# Patient Record
Sex: FEMALE | URBAN - NONMETROPOLITAN AREA
[De-identification: names, ages, dates, MRNs, and addresses within clinical notes are randomized per-mention and may not be internally consistent; named-entity substitution may affect disease eponyms.]

---

## 2017-01-06 RX ORDER — OLMESARTAN MEDOXOMIL 40 MG/1
TABLET, FILM COATED ORAL
Qty: 84 TABLET | Refills: 0 | Status: SHIPPED | OUTPATIENT
Start: 2017-01-06 | End: 2017-01-11 | Stop reason: ALTCHOICE

## 2017-01-11 ENCOUNTER — OFFICE VISIT (OUTPATIENT)
Dept: FAMILY MEDICINE CLINIC | Facility: CLINIC | Age: 22
End: 2017-01-11

## 2017-01-11 VITALS
WEIGHT: 165.81 LBS | SYSTOLIC BLOOD PRESSURE: 118 MMHG | DIASTOLIC BLOOD PRESSURE: 78 MMHG | TEMPERATURE: 99 F | BODY MASS INDEX: 29 KG/M2

## 2017-01-11 DIAGNOSIS — Z00.00 WELL ADULT EXAM: Primary | ICD-10-CM

## 2017-01-11 PROCEDURE — 99214 OFFICE O/P EST MOD 30 MIN: CPT | Performed by: INTERNAL MEDICINE

## 2017-01-11 PROCEDURE — 88175 CYTOPATH C/V AUTO FLUID REDO: CPT | Performed by: INTERNAL MEDICINE

## 2017-01-11 NOTE — PROGRESS NOTES
HPI:   Ramon Martinez is a 19year old female who presents for a complete physical exam. Symptoms: denies discharge, itching, burning or dysuria, periods are regular. Patient complains of nothing.        There is no immunization history on file for this pa suspicious lesions  HEENT: atraumatic, normocephalic,ears and throat are clear  EYES:PERRLA, EOMI, normal optic disk,conjunctiva are clear  NECK: supple,no adenopathy,no bruits  CHEST: no chest tenderness  BREAST: no dominant or suspicious mass  LUNGS: ian

## 2017-03-09 RX ORDER — NORGESTIMATE AND ETHINYL ESTRADIOL 7DAYSX3 28
KIT ORAL
Qty: 84 TABLET | Refills: 3 | Status: SHIPPED | OUTPATIENT
Start: 2017-03-09 | End: 2018-02-11

## 2017-07-03 ENCOUNTER — TELEPHONE (OUTPATIENT)
Dept: FAMILY MEDICINE CLINIC | Facility: CLINIC | Age: 22
End: 2017-07-03

## 2018-02-12 RX ORDER — NORGESTIMATE AND ETHINYL ESTRADIOL 7DAYSX3 28
KIT ORAL
Qty: 84 TABLET | Refills: 0 | Status: SHIPPED | OUTPATIENT
Start: 2018-02-12 | End: 2018-02-21 | Stop reason: ALTCHOICE

## 2018-02-12 NOTE — TELEPHONE ENCOUNTER
Last office visit: 1/11/2017    Last refill: 3/9/2017     Pending Prescriptions Disp Refills    NORGESTIM-ETH ESTRAD TRIPHASIC 0.18/0.215/0.25 MG-35 MCG Oral Tab [Pharmacy Med Name: NORGEST/EE  TAB  TRI 28] 84 tablet      Sig: TAKE 1 TABLET BY MOUTH  DAILY

## 2018-02-12 NOTE — TELEPHONE ENCOUNTER
Patient advised, she said that she got the implanted device so she does not need the [facility], order cancelled at [employer]. Appointment scheduled for a pap and physical on 2/21/18.

## 2018-02-21 ENCOUNTER — OFFICE VISIT (OUTPATIENT)
Dept: FAMILY MEDICINE CLINIC | Facility: CLINIC | Age: 23
End: 2018-02-21

## 2018-02-21 VITALS
TEMPERATURE: 98 F | DIASTOLIC BLOOD PRESSURE: 80 MMHG | HEIGHT: 64 IN | WEIGHT: 157.25 LBS | OXYGEN SATURATION: 99 % | HEART RATE: 117 BPM | BODY MASS INDEX: 26.85 KG/M2 | SYSTOLIC BLOOD PRESSURE: 110 MMHG

## 2018-02-21 DIAGNOSIS — Z00.00 WELL ADULT EXAM: Primary | ICD-10-CM

## 2018-02-21 PROCEDURE — 88175 CYTOPATH C/V AUTO FLUID REDO: CPT | Performed by: INTERNAL MEDICINE

## 2018-02-21 PROCEDURE — 87591 N.GONORRHOEAE DNA AMP PROB: CPT | Performed by: INTERNAL MEDICINE

## 2018-02-21 PROCEDURE — 99395 PREV VISIT EST AGE 18-39: CPT | Performed by: INTERNAL MEDICINE

## 2018-02-21 PROCEDURE — 87491 CHLMYD TRACH DNA AMP PROBE: CPT | Performed by: INTERNAL MEDICINE

## 2018-02-21 NOTE — PROGRESS NOTES
HPI:   Noreen Carter is a 23year old female who presents for a complete physical exam. Symptoms: denies discharge, itching, burning or dysuria, periods are irregular and she has a new iplanonon since Dec 2017 .  Patient complains of headaches, some bloat Wt 157 lb 4 oz   LMP 12/01/2017 (Approximate)   SpO2 99%   BMI 26.99 kg/m²   Body mass index is 26.99 kg/m².    GENERAL: well developed, well nourished,in no apparent distress  SKIN: no rashes,no suspicious lesions  HEENT: atraumatic, normocephalic,ears and

## 2018-02-22 LAB
C TRACH DNA SPEC QL NAA+PROBE: NEGATIVE
N GONORRHOEA DNA SPEC QL NAA+PROBE: NEGATIVE

## 2018-11-23 ENCOUNTER — OFFICE VISIT (OUTPATIENT)
Dept: FAMILY MEDICINE CLINIC | Facility: CLINIC | Age: 23
End: 2018-11-23
Payer: COMMERCIAL

## 2018-11-23 VITALS
HEART RATE: 100 BPM | TEMPERATURE: 99 F | SYSTOLIC BLOOD PRESSURE: 110 MMHG | DIASTOLIC BLOOD PRESSURE: 70 MMHG | OXYGEN SATURATION: 98 % | RESPIRATION RATE: 18 BRPM

## 2018-11-23 DIAGNOSIS — H02.849 SWELLING OF EYELID, UNSPECIFIED LATERALITY: Primary | ICD-10-CM

## 2018-11-23 PROCEDURE — 99213 OFFICE O/P EST LOW 20 MIN: CPT | Performed by: NURSE PRACTITIONER

## 2018-11-23 RX ORDER — PREDNISONE 20 MG/1
20 TABLET ORAL DAILY
Qty: 3 TABLET | Refills: 0 | Status: SHIPPED | OUTPATIENT
Start: 2018-11-23 | End: 2018-11-26

## 2018-11-23 NOTE — PROGRESS NOTES
CHIEF COMPLAINT:     Patient presents with:  Eyelid Swelling: itching x 1 day       HPI:   Judson Don is a 20year old female who presents with complaints of bilat eye itching and swelling since yesterday. Pt denies any change in vision.  Pt denies any Swelling of eyelid, unspecified laterality  -     predniSONE 20 MG Oral Tab; Take 1 tablet (20 mg total) by mouth daily for 3 days.       PLAN:    Patient Instructions   Use Claritin 10mg daily and Benadryl 25mg at bedtime      Conjunctivitis, Allergic    C Call your healthcare provider right away if any of these occur:  · Increased eyelid swelling  · New or worsening drainage from the eye  · Increasing redness around the eye  · Facial swelling  Date Last Reviewed: 7/1/2017  © 6157-7274 [employer],

## 2018-11-23 NOTE — PATIENT INSTRUCTIONS
Use Claritin 10mg daily and Benadryl 25mg at bedtime      Conjunctivitis, Allergic    Conjunctivitis is an irritation of a thin membrane in the eye. This membrane is called the conjunctiva. It covers the white of the eye and the inside of the eyelid.  The c swelling  Date Last Reviewed: 7/1/2017  © 4698-0456 [employer]. [address] [facility] [place] [facility] [place]. All rights reserved. This information is not intended as a substitute for professional medical care.  Always follow your healthcare pro

## 2019-01-16 ENCOUNTER — OFFICE VISIT (OUTPATIENT)
Dept: FAMILY MEDICINE CLINIC | Facility: CLINIC | Age: 24
End: 2019-01-16
Payer: COMMERCIAL

## 2019-01-16 VITALS
HEART RATE: 70 BPM | TEMPERATURE: 98 F | DIASTOLIC BLOOD PRESSURE: 62 MMHG | OXYGEN SATURATION: 98 % | SYSTOLIC BLOOD PRESSURE: 116 MMHG | RESPIRATION RATE: 18 BRPM | WEIGHT: 157 LBS | BODY MASS INDEX: 27 KG/M2

## 2019-01-16 DIAGNOSIS — H10.023 OTHER MUCOPURULENT CONJUNCTIVITIS OF BOTH EYES: Primary | ICD-10-CM

## 2019-01-16 PROCEDURE — 99213 OFFICE O/P EST LOW 20 MIN: CPT | Performed by: PHYSICIAN ASSISTANT

## 2019-01-16 RX ORDER — CIPROFLOXACIN HYDROCHLORIDE 3.5 MG/ML
1 SOLUTION/ DROPS TOPICAL EVERY 6 HOURS
Qty: 5 ML | Refills: 0 | Status: SHIPPED | OUTPATIENT
Start: 2019-01-16 | End: 2019-09-30 | Stop reason: ALTCHOICE

## 2019-01-16 NOTE — PATIENT INSTRUCTIONS
Please follow up with your PCP if no improvement within 5-7 days. Go directly to the ER for any acute worsening of symptoms. Patient Instructions    Conjunctivitis (Pink Eye) is very contagious.  This is spread by direct contact after touching your infect

## 2019-01-16 NOTE — PROGRESS NOTES
CHIEF COMPLAINT:   Patient presents with:  Eye Problem: redness and discharge in both eyes x 1 day, contact lens wearer     HPI:   Bula Heimlich is a 20year old female who presents with chief complaint of \"pink eye\". Symptoms began  1  days ago.  Sympt EYES: PERRLA, EOMI, bilateral conjunctiva erythematous, injected, scant yellow discharge bilaerally, no tearing,  no lid swelling.  No swelling or redness of periorbital tissue.    HENT: atraumatic, normocephalic,ears and throat are clear  NECK: supple, no Apply a new clean warm moist compress to the affected eye as often as possible today. This is comforting and the warm compress increases the blood flow to the area and promotes healing.    Discard any contact lenses that were worn recently and do not wear c

## 2019-09-30 ENCOUNTER — OFFICE VISIT (OUTPATIENT)
Dept: FAMILY MEDICINE CLINIC | Facility: CLINIC | Age: 24
End: 2019-09-30

## 2019-09-30 VITALS
HEIGHT: 64 IN | WEIGHT: 170 LBS | RESPIRATION RATE: 25 BRPM | DIASTOLIC BLOOD PRESSURE: 70 MMHG | SYSTOLIC BLOOD PRESSURE: 100 MMHG | TEMPERATURE: 98 F | HEART RATE: 80 BPM | BODY MASS INDEX: 29.02 KG/M2

## 2019-09-30 DIAGNOSIS — R42 DIZZINESS: Primary | ICD-10-CM

## 2019-09-30 PROCEDURE — 85025 COMPLETE CBC W/AUTO DIFF WBC: CPT | Performed by: INTERNAL MEDICINE

## 2019-09-30 PROCEDURE — 80053 COMPREHEN METABOLIC PANEL: CPT | Performed by: INTERNAL MEDICINE

## 2019-09-30 PROCEDURE — 99214 OFFICE O/P EST MOD 30 MIN: CPT | Performed by: INTERNAL MEDICINE

## 2019-09-30 PROCEDURE — 84443 ASSAY THYROID STIM HORMONE: CPT | Performed by: INTERNAL MEDICINE

## 2019-09-30 PROCEDURE — 84439 ASSAY OF FREE THYROXINE: CPT | Performed by: INTERNAL MEDICINE

## 2019-09-30 NOTE — PROGRESS NOTES
Ann Bond is a 30year old female. HPI:   Pt had an episode of visual changes when driving last week. She had no prodrome and felt like her head was tilting back and forth. No hx of migraine and no symptoms since.   Lasted about 5 seconds, but she Prescriptions      No prescriptions requested or ordered in this encounter       Imaging & Consults:  None    Follow up as needed. The patient indicates understanding of these issues and agrees to the plan.

## 2020-09-15 ENCOUNTER — OFFICE VISIT (OUTPATIENT)
Dept: FAMILY MEDICINE CLINIC | Facility: CLINIC | Age: 25
End: 2020-09-15

## 2020-09-15 VITALS
RESPIRATION RATE: 16 BRPM | HEART RATE: 77 BPM | TEMPERATURE: 98 F | DIASTOLIC BLOOD PRESSURE: 60 MMHG | OXYGEN SATURATION: 98 % | WEIGHT: 170 LBS | HEIGHT: 64 IN | BODY MASS INDEX: 29.02 KG/M2 | SYSTOLIC BLOOD PRESSURE: 110 MMHG

## 2020-09-15 DIAGNOSIS — Z23 NEED FOR VACCINATION: ICD-10-CM

## 2020-09-15 DIAGNOSIS — Z00.00 WELL ADULT EXAM: Primary | ICD-10-CM

## 2020-09-15 DIAGNOSIS — Z30.9 ENCOUNTER FOR CONTRACEPTIVE MANAGEMENT, UNSPECIFIED TYPE: ICD-10-CM

## 2020-09-15 PROCEDURE — 88175 CYTOPATH C/V AUTO FLUID REDO: CPT | Performed by: INTERNAL MEDICINE

## 2020-09-15 PROCEDURE — 3008F BODY MASS INDEX DOCD: CPT | Performed by: INTERNAL MEDICINE

## 2020-09-15 PROCEDURE — 90471 IMMUNIZATION ADMIN: CPT | Performed by: INTERNAL MEDICINE

## 2020-09-15 PROCEDURE — 87491 CHLMYD TRACH DNA AMP PROBE: CPT | Performed by: INTERNAL MEDICINE

## 2020-09-15 PROCEDURE — 87591 N.GONORRHOEAE DNA AMP PROB: CPT | Performed by: INTERNAL MEDICINE

## 2020-09-15 PROCEDURE — 90686 IIV4 VACC NO PRSV 0.5 ML IM: CPT | Performed by: INTERNAL MEDICINE

## 2020-09-15 PROCEDURE — 3074F SYST BP LT 130 MM HG: CPT | Performed by: INTERNAL MEDICINE

## 2020-09-15 PROCEDURE — 99395 PREV VISIT EST AGE 18-39: CPT | Performed by: INTERNAL MEDICINE

## 2020-09-15 PROCEDURE — 3078F DIAST BP <80 MM HG: CPT | Performed by: INTERNAL MEDICINE

## 2020-09-16 NOTE — PROGRESS NOTES
HPI:   Britta Pedraza is a 30year old female who presents for a complete physical exam. Symptoms: denies discharge, itching, burning or dysuria, periods are regular. Patient needs nexplanon swap out. Has been 3 years.          Immunization History  Admin anxiety  HEMATOLOGIC: denies hx of anemia  ENDOCRINE: denies thyroid history  ALL/ASTHMA: denies hx of allergy or asthma    EXAM:   /60 (BP Location: Right arm, Patient Position: Sitting, Cuff Size: adult)   Pulse 77   Temp 98.3 °F (36.8 °C) (Tempora

## 2020-09-17 LAB
C TRACH DNA SPEC QL NAA+PROBE: NEGATIVE
N GONORRHOEA DNA SPEC QL NAA+PROBE: NEGATIVE

## 2020-10-19 ENCOUNTER — TELEPHONE (OUTPATIENT)
Dept: OBGYN CLINIC | Facility: CLINIC | Age: 25
End: 2020-10-19

## 2020-10-19 NOTE — TELEPHONE ENCOUNTER
NP called requesting a Nexplanon removal and insert. She states that her mother-in-law knows you and that we should squeeze her in asap.

## 2020-10-20 NOTE — TELEPHONE ENCOUNTER
LMTCB re: appt. I did add pt to schedule so she will also be notified via [employer]ur[employer]. Advised in message for pt to call office if this does not work for her.      Future Appointments   Date Time Provider Sameera Lainez   11/4/2020 10:15 AM Evonne Luz

## 2020-11-04 ENCOUNTER — OFFICE VISIT (OUTPATIENT)
Dept: OBGYN CLINIC | Facility: CLINIC | Age: 25
End: 2020-11-04

## 2020-11-04 VITALS
HEIGHT: 64 IN | SYSTOLIC BLOOD PRESSURE: 102 MMHG | DIASTOLIC BLOOD PRESSURE: 60 MMHG | WEIGHT: 169 LBS | BODY MASS INDEX: 28.85 KG/M2

## 2020-11-04 DIAGNOSIS — Z32.02 PREGNANCY EXAMINATION OR TEST, NEGATIVE RESULT: Primary | ICD-10-CM

## 2020-11-04 DIAGNOSIS — Z30.46 ENCOUNTER FOR REMOVAL AND REINSERTION OF NEXPLANON: ICD-10-CM

## 2020-11-04 PROCEDURE — 3078F DIAST BP <80 MM HG: CPT | Performed by: OBSTETRICS & GYNECOLOGY

## 2020-11-04 PROCEDURE — 3074F SYST BP LT 130 MM HG: CPT | Performed by: OBSTETRICS & GYNECOLOGY

## 2020-11-04 PROCEDURE — 11983 REMOVE/INSERT DRUG IMPLANT: CPT | Performed by: OBSTETRICS & GYNECOLOGY

## 2020-11-04 PROCEDURE — 3008F BODY MASS INDEX DOCD: CPT | Performed by: OBSTETRICS & GYNECOLOGY

## 2020-11-04 PROCEDURE — 81025 URINE PREGNANCY TEST: CPT | Performed by: OBSTETRICS & GYNECOLOGY

## 2020-11-04 RX ORDER — LIDOCAINE HYDROCHLORIDE 10 MG/ML
5 INJECTION, SOLUTION INFILTRATION; PERINEURAL ONCE
Status: COMPLETED | OUTPATIENT
Start: 2020-11-04 | End: 2020-11-04

## 2020-11-04 RX ADMIN — LIDOCAINE HYDROCHLORIDE 5 ML: 10 INJECTION, SOLUTION INFILTRATION; PERINEURAL at 14:00:00

## 2020-11-04 NOTE — PROGRESS NOTES
Ashlyn Bonds presents today for nexplanon removal and reinsertion. She was counseled on risks of irregular bleeding and migration. Consent form signed    Her prior nexplanon was palpable in her left arm.  The prior incision was seen, but the nexplanon

## 2021-05-23 ENCOUNTER — PATIENT MESSAGE (OUTPATIENT)
Dept: FAMILY MEDICINE CLINIC | Facility: CLINIC | Age: 26
End: 2021-05-23

## 2021-05-24 NOTE — TELEPHONE ENCOUNTER
From: Amara Crowder  To: Jn Whitley MD  Sent: 5/23/2021 8:16 AM CDT  Subject: Non-Urgent Medical Question    Hi can you please update my records to show I have had the J&J COVID-19 vaccine please. Thank you!

## 2021-11-22 ENCOUNTER — OFFICE VISIT (OUTPATIENT)
Dept: OBGYN CLINIC | Facility: CLINIC | Age: 26
End: 2021-11-22

## 2021-11-22 VITALS
RESPIRATION RATE: 16 BRPM | WEIGHT: 186.19 LBS | DIASTOLIC BLOOD PRESSURE: 68 MMHG | SYSTOLIC BLOOD PRESSURE: 110 MMHG | BODY MASS INDEX: 31.79 KG/M2 | HEIGHT: 64 IN | HEART RATE: 82 BPM

## 2021-11-22 DIAGNOSIS — Z01.419 WELL WOMAN EXAM WITH ROUTINE GYNECOLOGICAL EXAM: Primary | ICD-10-CM

## 2021-11-22 DIAGNOSIS — Z30.09 CONTRACEPTIVE EDUCATION: ICD-10-CM

## 2021-11-22 PROBLEM — Z30.46 ENCOUNTER FOR REMOVAL AND REINSERTION OF NEXPLANON: Status: RESOLVED | Noted: 2020-11-04 | Resolved: 2021-11-22

## 2021-11-22 PROCEDURE — 99395 PREV VISIT EST AGE 18-39: CPT | Performed by: OBSTETRICS & GYNECOLOGY

## 2021-11-22 PROCEDURE — 3078F DIAST BP <80 MM HG: CPT | Performed by: OBSTETRICS & GYNECOLOGY

## 2021-11-22 PROCEDURE — 3074F SYST BP LT 130 MM HG: CPT | Performed by: OBSTETRICS & GYNECOLOGY

## 2021-11-22 PROCEDURE — 3008F BODY MASS INDEX DOCD: CPT | Performed by: OBSTETRICS & GYNECOLOGY

## 2021-11-23 NOTE — PATIENT INSTRUCTIONS
If you want to have the nexplanon removed please call for appt--make sure they know you need it removed and IUD inserted    For IUD I would want to send in a medicine called misoprostol to help dilate your cervix to make the entry easier  I would also have

## 2021-11-23 NOTE — PROGRESS NOTES
GYN H&P     2021  9:45 PM    CC: Patient is here for annual exam    HPI: patient is a 27year old  here for her annual exam, contraception consult  Patient has 2nd nexplanon that was placed last year  She was pleased with her first nexplanon. well-developed. She is not diaphoretic. Chest:   Breasts: Breasts are symmetrical.      Right: No inverted nipple, mass, nipple discharge, skin change or tenderness. Left: No inverted nipple, mass, nipple discharge, skin change or tenderness. cycle, irregular bleeding. She will consider her options  If desires to have the nexplanon removed and IUD inserted, she will need a 45 minute appt. I stressed to her to make sure if she calls for appt that this is clarified.      Saran Sanabria MD

## 2022-02-03 ENCOUNTER — OFFICE VISIT (OUTPATIENT)
Dept: FAMILY MEDICINE CLINIC | Facility: CLINIC | Age: 27
End: 2022-02-03
Payer: COMMERCIAL

## 2022-02-03 VITALS
RESPIRATION RATE: 16 BRPM | HEART RATE: 78 BPM | BODY MASS INDEX: 32.35 KG/M2 | OXYGEN SATURATION: 99 % | HEIGHT: 64 IN | SYSTOLIC BLOOD PRESSURE: 110 MMHG | TEMPERATURE: 98 F | DIASTOLIC BLOOD PRESSURE: 68 MMHG | WEIGHT: 189.5 LBS

## 2022-02-03 DIAGNOSIS — M25.561 ACUTE PAIN OF RIGHT KNEE: Primary | ICD-10-CM

## 2022-02-03 PROCEDURE — 3078F DIAST BP <80 MM HG: CPT | Performed by: INTERNAL MEDICINE

## 2022-02-03 PROCEDURE — 99214 OFFICE O/P EST MOD 30 MIN: CPT | Performed by: INTERNAL MEDICINE

## 2022-02-03 PROCEDURE — 3074F SYST BP LT 130 MM HG: CPT | Performed by: INTERNAL MEDICINE

## 2022-02-03 PROCEDURE — 3008F BODY MASS INDEX DOCD: CPT | Performed by: INTERNAL MEDICINE

## 2022-02-03 RX ORDER — MELOXICAM 15 MG/1
15 TABLET ORAL DAILY
Qty: 30 TABLET | Refills: 0 | Status: SHIPPED | OUTPATIENT
Start: 2022-02-03 | End: 2022-03-05

## 2022-11-10 ENCOUNTER — TELEPHONE (OUTPATIENT)
Dept: FAMILY MEDICINE CLINIC | Facility: CLINIC | Age: 27
End: 2022-11-10

## 2022-11-10 NOTE — TELEPHONE ENCOUNTER
I can take her on. However, will there be insurance issues with her having an HMO and Dr. Quang Vazquez listed as PCP.   Thanks

## 2022-11-10 NOTE — TELEPHONE ENCOUNTER
PT's spouse Render Cons called he wants to know if Dr. Duane Ortega would be will to establish care with pt? She is needing to have a yearly physical.  He is a pt of [facility]. Is  willing to take on pt?     Please call spouse at [phone]

## 2022-12-09 ENCOUNTER — OFFICE VISIT (OUTPATIENT)
Dept: FAMILY MEDICINE CLINIC | Facility: CLINIC | Age: 27
End: 2022-12-09
Payer: COMMERCIAL

## 2022-12-09 VITALS
RESPIRATION RATE: 16 BRPM | TEMPERATURE: 97 F | HEART RATE: 74 BPM | WEIGHT: 180.63 LBS | OXYGEN SATURATION: 97 % | SYSTOLIC BLOOD PRESSURE: 110 MMHG | DIASTOLIC BLOOD PRESSURE: 76 MMHG | BODY MASS INDEX: 31 KG/M2

## 2022-12-09 DIAGNOSIS — Z00.00 WELL ADULT EXAM: Primary | ICD-10-CM

## 2022-12-09 DIAGNOSIS — N92.6 ABNORMAL MENSES: ICD-10-CM

## 2022-12-09 PROBLEM — Z30.09 CONTRACEPTIVE EDUCATION: Status: RESOLVED | Noted: 2021-11-22 | Resolved: 2022-12-09

## 2022-12-09 PROCEDURE — 90471 IMMUNIZATION ADMIN: CPT | Performed by: FAMILY MEDICINE

## 2022-12-09 PROCEDURE — 90715 TDAP VACCINE 7 YRS/> IM: CPT | Performed by: FAMILY MEDICINE

## 2022-12-09 PROCEDURE — 99395 PREV VISIT EST AGE 18-39: CPT | Performed by: FAMILY MEDICINE

## 2022-12-09 PROCEDURE — 3078F DIAST BP <80 MM HG: CPT | Performed by: FAMILY MEDICINE

## 2022-12-09 PROCEDURE — 3074F SYST BP LT 130 MM HG: CPT | Performed by: FAMILY MEDICINE

## 2022-12-22 ENCOUNTER — NURSE ONLY (OUTPATIENT)
Dept: FAMILY MEDICINE CLINIC | Facility: CLINIC | Age: 27
End: 2022-12-22
Payer: COMMERCIAL

## 2022-12-22 DIAGNOSIS — N92.6 ABNORMAL MENSES: ICD-10-CM

## 2022-12-22 DIAGNOSIS — Z00.00 WELL ADULT EXAM: ICD-10-CM

## 2022-12-22 LAB
ALT SERPL-CCNC: 24 U/L
ANION GAP SERPL CALC-SCNC: 4 MMOL/L (ref 0–18)
AST SERPL-CCNC: 13 U/L (ref 15–37)
BUN BLD-MCNC: 11 MG/DL (ref 7–18)
CALCIUM BLD-MCNC: 9 MG/DL (ref 8.5–10.1)
CHLORIDE SERPL-SCNC: 108 MMOL/L (ref 98–112)
CHOLEST SERPL-MCNC: 136 MG/DL (ref ?–200)
CO2 SERPL-SCNC: 27 MMOL/L (ref 21–32)
CREAT BLD-MCNC: 0.7 MG/DL
ERYTHROCYTE [DISTWIDTH] IN BLOOD BY AUTOMATED COUNT: 11.9 %
FASTING PATIENT LIPID ANSWER: YES
FASTING STATUS PATIENT QL REPORTED: YES
GFR SERPLBLD BASED ON 1.73 SQ M-ARVRAT: 120 ML/MIN/1.73M2 (ref 60–?)
GLUCOSE BLD-MCNC: 95 MG/DL (ref 70–99)
HCT VFR BLD AUTO: 43.7 %
HDLC SERPL-MCNC: 64 MG/DL (ref 40–59)
HGB BLD-MCNC: 14.4 G/DL
LDLC SERPL CALC-MCNC: 55 MG/DL (ref ?–100)
MCH RBC QN AUTO: 31.8 PG (ref 26–34)
MCHC RBC AUTO-ENTMCNC: 33 G/DL (ref 31–37)
MCV RBC AUTO: 96.5 FL
NONHDLC SERPL-MCNC: 72 MG/DL (ref ?–130)
OSMOLALITY SERPL CALC.SUM OF ELEC: 287 MOSM/KG (ref 275–295)
PLATELET # BLD AUTO: 279 10(3)UL (ref 150–450)
POTASSIUM SERPL-SCNC: 4.2 MMOL/L (ref 3.5–5.1)
RBC # BLD AUTO: 4.53 X10(6)UL
SODIUM SERPL-SCNC: 139 MMOL/L (ref 136–145)
TRIGL SERPL-MCNC: 88 MG/DL (ref 30–149)
TSI SER-ACNC: 1.52 MIU/ML (ref 0.36–3.74)
VLDLC SERPL CALC-MCNC: 13 MG/DL (ref 0–30)
WBC # BLD AUTO: 8.8 X10(3) UL (ref 4–11)

## 2022-12-22 PROCEDURE — 84443 ASSAY THYROID STIM HORMONE: CPT | Performed by: FAMILY MEDICINE

## 2022-12-22 PROCEDURE — 80048 BASIC METABOLIC PNL TOTAL CA: CPT | Performed by: FAMILY MEDICINE

## 2022-12-22 PROCEDURE — 80061 LIPID PANEL: CPT | Performed by: FAMILY MEDICINE

## 2022-12-22 PROCEDURE — 85027 COMPLETE CBC AUTOMATED: CPT | Performed by: FAMILY MEDICINE

## 2022-12-22 PROCEDURE — 84460 ALANINE AMINO (ALT) (SGPT): CPT | Performed by: FAMILY MEDICINE

## 2022-12-22 PROCEDURE — 84450 TRANSFERASE (AST) (SGOT): CPT | Performed by: FAMILY MEDICINE

## 2022-12-22 NOTE — PROGRESS NOTES
Patient to clinic for labs per PHYLLIS Guan and mint tube drawn right AC x 1 attempt    Patient left office in stable condition

## 2022-12-27 ENCOUNTER — OFFICE VISIT (OUTPATIENT)
Dept: FAMILY MEDICINE CLINIC | Facility: CLINIC | Age: 27
End: 2022-12-27
Payer: COMMERCIAL

## 2022-12-27 VITALS
RESPIRATION RATE: 18 BRPM | WEIGHT: 179.5 LBS | HEART RATE: 66 BPM | BODY MASS INDEX: 31 KG/M2 | SYSTOLIC BLOOD PRESSURE: 118 MMHG | TEMPERATURE: 98 F | OXYGEN SATURATION: 98 % | DIASTOLIC BLOOD PRESSURE: 76 MMHG

## 2022-12-27 DIAGNOSIS — L70.0 CYSTIC ACNE: Primary | ICD-10-CM

## 2022-12-27 PROCEDURE — 3078F DIAST BP <80 MM HG: CPT | Performed by: FAMILY MEDICINE

## 2022-12-27 PROCEDURE — 99214 OFFICE O/P EST MOD 30 MIN: CPT | Performed by: FAMILY MEDICINE

## 2022-12-27 PROCEDURE — 3074F SYST BP LT 130 MM HG: CPT | Performed by: FAMILY MEDICINE

## 2022-12-27 RX ORDER — DOXYCYCLINE HYCLATE 100 MG/1
100 CAPSULE ORAL 2 TIMES DAILY
Qty: 60 CAPSULE | Refills: 0 | Status: SHIPPED | OUTPATIENT
Start: 2022-12-27 | End: 2023-01-26

## 2023-01-16 NOTE — PROGRESS NOTES
Elroy Silva presents today for nexplanon removal and kyleena inserted. She was counseled on risks of irregular bleeding and migration. Consent form signed    The patient was taken to the procedure room where the arm was prepped with alcohol swab. Local anesthetic 1% lidocaine was used to inject the site. Betadine was used to cleanse the area. Anesthesia was confirmed    Then, attention was turned to the [facility] insertion. Speculum was inserted. Old brown discharge was noted in the vault. The cervix was visualized and grasped with a tenaculum. The uterus was sounded to 6.5 cm. Then, the kyleena was inserted without difficulty and IUD strings were trimmed to 2.5 cm. The tenaculum was removed and sites hemostatic. The speculum was removed. The site of the prior nexplanon was palpated and small 2 mm skin incision made with the scalpel. The prior nexplanon was removed intact. The removal site was reapproximated with steri strips, a band aid, and a pressure dressing. The patient tolerated well. Pt to return for annual and IUD check.  Reviewed bleeding precautions    Author MD Melissa, 01/17/23, 11:02 AM

## 2023-01-17 ENCOUNTER — OFFICE VISIT (OUTPATIENT)
Dept: OBGYN CLINIC | Facility: CLINIC | Age: 28
End: 2023-01-17
Payer: COMMERCIAL

## 2023-01-17 VITALS
BODY MASS INDEX: 31 KG/M2 | WEIGHT: 180.25 LBS | HEART RATE: 78 BPM | SYSTOLIC BLOOD PRESSURE: 116 MMHG | DIASTOLIC BLOOD PRESSURE: 80 MMHG

## 2023-01-17 DIAGNOSIS — Z30.46 NEXPLANON REMOVAL: Primary | ICD-10-CM

## 2023-01-17 DIAGNOSIS — Z30.430 ENCOUNTER FOR IUD INSERTION: ICD-10-CM

## 2023-01-17 DIAGNOSIS — Z01.812 PRE-PROCEDURAL LABORATORY EXAMINATION: ICD-10-CM

## 2023-01-17 LAB
CONTROL LINE PRESENT WITH A CLEAR BACKGROUND (YES/NO): YES YES/NO
PREGNANCY TEST, URINE: NEGATIVE

## 2023-01-17 PROCEDURE — 58300 INSERT INTRAUTERINE DEVICE: CPT | Performed by: OBSTETRICS & GYNECOLOGY

## 2023-01-17 PROCEDURE — 11982 REMOVE DRUG IMPLANT DEVICE: CPT | Performed by: OBSTETRICS & GYNECOLOGY

## 2023-01-17 PROCEDURE — 81025 URINE PREGNANCY TEST: CPT | Performed by: OBSTETRICS & GYNECOLOGY

## 2023-01-17 PROCEDURE — 3079F DIAST BP 80-89 MM HG: CPT | Performed by: OBSTETRICS & GYNECOLOGY

## 2023-01-17 PROCEDURE — 3074F SYST BP LT 130 MM HG: CPT | Performed by: OBSTETRICS & GYNECOLOGY

## 2023-01-17 RX ORDER — IBUPROFEN 600 MG/1
600 TABLET ORAL ONCE
Status: COMPLETED | OUTPATIENT
Start: 2023-01-17 | End: 2023-01-17

## 2023-01-17 RX ADMIN — IBUPROFEN 600 MG: 600 TABLET ORAL at 11:07:00

## 2023-02-07 ENCOUNTER — OFFICE VISIT (OUTPATIENT)
Dept: OBGYN CLINIC | Facility: CLINIC | Age: 28
End: 2023-02-07
Payer: COMMERCIAL

## 2023-02-07 VITALS
HEIGHT: 66 IN | BODY MASS INDEX: 29.28 KG/M2 | WEIGHT: 182.19 LBS | SYSTOLIC BLOOD PRESSURE: 112 MMHG | HEART RATE: 85 BPM | DIASTOLIC BLOOD PRESSURE: 80 MMHG

## 2023-02-07 DIAGNOSIS — Z30.431 IUD CHECK UP: ICD-10-CM

## 2023-02-07 DIAGNOSIS — Z12.31 ENCOUNTER FOR SCREENING MAMMOGRAM FOR MALIGNANT NEOPLASM OF BREAST: ICD-10-CM

## 2023-02-07 DIAGNOSIS — Z01.419 WELL WOMAN EXAM WITH ROUTINE GYNECOLOGICAL EXAM: Primary | ICD-10-CM

## 2023-02-07 DIAGNOSIS — Z12.4 SCREENING FOR MALIGNANT NEOPLASM OF CERVIX: ICD-10-CM

## 2023-02-07 PROCEDURE — 3074F SYST BP LT 130 MM HG: CPT | Performed by: OBSTETRICS & GYNECOLOGY

## 2023-02-07 PROCEDURE — 99395 PREV VISIT EST AGE 18-39: CPT | Performed by: OBSTETRICS & GYNECOLOGY

## 2023-02-07 PROCEDURE — 3079F DIAST BP 80-89 MM HG: CPT | Performed by: OBSTETRICS & GYNECOLOGY

## 2023-02-07 PROCEDURE — 3008F BODY MASS INDEX DOCD: CPT | Performed by: OBSTETRICS & GYNECOLOGY

## 2023-04-18 ENCOUNTER — OFFICE VISIT (OUTPATIENT)
Dept: FAMILY MEDICINE CLINIC | Facility: CLINIC | Age: 28
End: 2023-04-18
Payer: COMMERCIAL

## 2023-04-18 VITALS
BODY MASS INDEX: 29 KG/M2 | DIASTOLIC BLOOD PRESSURE: 80 MMHG | TEMPERATURE: 98 F | SYSTOLIC BLOOD PRESSURE: 110 MMHG | OXYGEN SATURATION: 97 % | HEART RATE: 91 BPM | WEIGHT: 180 LBS | RESPIRATION RATE: 16 BRPM

## 2023-04-18 DIAGNOSIS — M79.671 RIGHT FOOT PAIN: Primary | ICD-10-CM

## 2023-04-18 PROCEDURE — 99213 OFFICE O/P EST LOW 20 MIN: CPT | Performed by: FAMILY MEDICINE

## 2023-04-18 PROCEDURE — 3079F DIAST BP 80-89 MM HG: CPT | Performed by: FAMILY MEDICINE

## 2023-04-18 PROCEDURE — 3074F SYST BP LT 130 MM HG: CPT | Performed by: FAMILY MEDICINE

## 2023-04-19 ENCOUNTER — TELEPHONE (OUTPATIENT)
Dept: ORTHOPEDICS CLINIC | Facility: CLINIC | Age: 28
End: 2023-04-19

## 2023-05-03 ENCOUNTER — HOSPITAL ENCOUNTER (OUTPATIENT)
Dept: GENERAL RADIOLOGY | Age: 28
Discharge: HOME | End: 2023-05-03
Attending: PODIATRIST
Payer: COMMERCIAL

## 2023-05-03 ENCOUNTER — OFFICE VISIT (OUTPATIENT)
Dept: ORTHOPEDICS CLINIC | Facility: CLINIC | Age: 28
End: 2023-05-03
Payer: COMMERCIAL

## 2023-05-03 VITALS — BODY MASS INDEX: 29.99 KG/M2 | HEIGHT: 65 IN | WEIGHT: 180 LBS

## 2023-05-03 DIAGNOSIS — M79.89 SWELLING OF RIGHT FOOT: ICD-10-CM

## 2023-05-03 DIAGNOSIS — L85.9 HYPERKERATOSIS: ICD-10-CM

## 2023-05-03 DIAGNOSIS — M79.671 FOOT PAIN, RIGHT: ICD-10-CM

## 2023-05-03 DIAGNOSIS — M25.571 SINUS TARSI SYNDROME OF RIGHT FOOT: Primary | ICD-10-CM

## 2023-05-03 PROCEDURE — 3008F BODY MASS INDEX DOCD: CPT | Performed by: PODIATRIST

## 2023-05-03 PROCEDURE — 73630 X-RAY EXAM OF FOOT: CPT | Performed by: PODIATRIST

## 2023-05-03 PROCEDURE — 99203 OFFICE O/P NEW LOW 30 MIN: CPT | Performed by: PODIATRIST

## 2023-12-09 ENCOUNTER — OFFICE VISIT (OUTPATIENT)
Dept: FAMILY MEDICINE CLINIC | Facility: CLINIC | Age: 28
End: 2023-12-09
Payer: COMMERCIAL

## 2023-12-09 VITALS
SYSTOLIC BLOOD PRESSURE: 118 MMHG | WEIGHT: 174.63 LBS | BODY MASS INDEX: 29.09 KG/M2 | HEART RATE: 92 BPM | DIASTOLIC BLOOD PRESSURE: 70 MMHG | HEIGHT: 65 IN | TEMPERATURE: 98 F | OXYGEN SATURATION: 98 %

## 2023-12-09 DIAGNOSIS — J02.9 SORE THROAT: Primary | ICD-10-CM

## 2023-12-09 LAB
CONTROL LINE PRESENT WITH A CLEAR BACKGROUND (YES/NO): YES YES/NO
KIT LOT #: 7023 NUMERIC
STREP GRP A CUL-SCR: NEGATIVE

## 2023-12-09 PROCEDURE — 99213 OFFICE O/P EST LOW 20 MIN: CPT | Performed by: NURSE PRACTITIONER

## 2023-12-09 PROCEDURE — 3078F DIAST BP <80 MM HG: CPT | Performed by: NURSE PRACTITIONER

## 2023-12-09 PROCEDURE — 3008F BODY MASS INDEX DOCD: CPT | Performed by: NURSE PRACTITIONER

## 2023-12-09 PROCEDURE — 3074F SYST BP LT 130 MM HG: CPT | Performed by: NURSE PRACTITIONER

## 2023-12-09 PROCEDURE — 87880 STREP A ASSAY W/OPTIC: CPT | Performed by: NURSE PRACTITIONER

## 2023-12-11 ENCOUNTER — PATIENT MESSAGE (OUTPATIENT)
Dept: FAMILY MEDICINE CLINIC | Facility: CLINIC | Age: 28
End: 2023-12-11

## 2023-12-11 RX ORDER — AZITHROMYCIN 250 MG/1
TABLET, FILM COATED ORAL
Qty: 6 TABLET | Refills: 0 | Status: SHIPPED | OUTPATIENT
Start: 2023-12-11 | End: 2023-12-15

## 2023-12-11 NOTE — TELEPHONE ENCOUNTER
From: Mrya Howell  To: Avaak Yessi  Sent: 12/11/2023 7:32 AM CST  Subject: Cough    Hi,    My cough is getting worse and at my appointment you mentioned if my cough got worse to let you know and you could prescribe a cough medicine.

## 2024-02-16 ENCOUNTER — OFFICE VISIT (OUTPATIENT)
Dept: OBGYN CLINIC | Facility: CLINIC | Age: 29
End: 2024-02-16
Payer: COMMERCIAL

## 2024-02-16 VITALS
WEIGHT: 176.13 LBS | DIASTOLIC BLOOD PRESSURE: 78 MMHG | BODY MASS INDEX: 29 KG/M2 | HEART RATE: 105 BPM | SYSTOLIC BLOOD PRESSURE: 116 MMHG

## 2024-02-16 DIAGNOSIS — Z01.419 WELL WOMAN EXAM WITH ROUTINE GYNECOLOGICAL EXAM: Primary | ICD-10-CM

## 2024-02-16 DIAGNOSIS — Z30.432 ENCOUNTER FOR REMOVAL OF INTRAUTERINE CONTRACEPTIVE DEVICE: ICD-10-CM

## 2024-02-16 DIAGNOSIS — Z30.432 ENCOUNTER FOR IUD REMOVAL: ICD-10-CM

## 2024-02-16 PROCEDURE — 99395 PREV VISIT EST AGE 18-39: CPT | Performed by: OBSTETRICS & GYNECOLOGY

## 2024-02-16 PROCEDURE — 3078F DIAST BP <80 MM HG: CPT | Performed by: OBSTETRICS & GYNECOLOGY

## 2024-02-16 PROCEDURE — 58301 REMOVE INTRAUTERINE DEVICE: CPT | Performed by: OBSTETRICS & GYNECOLOGY

## 2024-02-16 PROCEDURE — 3074F SYST BP LT 130 MM HG: CPT | Performed by: OBSTETRICS & GYNECOLOGY

## 2024-02-16 NOTE — PROGRESS NOTES
[facility]  Obstetrics and Gynecology  History & Physical    CC: Patient presents for a well woman exam     Subjective:     HPI: Savanah Jansen is a 28 year old  female here for a well women exam. Patient reports doing well. She would like to remove Kyleena IUD. Will try for pregnancy.     OB History:  OB History    Para Term  AB Living   0 0 0 0 0 0   SAB IAB Ectopic Multiple Live Births   0 0 0 0 0       Gyne History:  Hx Prior Abnormal Pap: No  Pap Date: 23  Pap Result Notes: wnl  No LMP recorded. (Menstrual status: IUD - Intrauterine Device).  NILM 2023  denies history of STDs (non-HPV).   Sexual history: Active? yes  Birth control? Kyleena 2023    Meds:  Current Outpatient Medications on File Prior to Visit   Medication Sig Dispense Refill    Levonorgestrel (KYLEENA IU) by Intrauterine route.       No current facility-administered medications on file prior to visit.       All:  No Known Allergies    PMH:  History reviewed. No pertinent past medical history.    Immunization History:   Immunization History   Administered Date(s) Administered    Covid-19 Vaccine [facility] (J&J) 0.5ml 2021    DTP/HIB Combined 1994    FLULAVAL 6 months & older 0.5 ml Prefilled syringe ([id number]) 09/15/2020    Hep B, Unspecified Formulation 08/10/1998, 2003, 2003    Hib, Unspecified Formulation 1993, 1993, 10/14/1994    MMR 10/14/1994, 08/10/1998    OPV 1993, 1993, 1994, 08/10/1998    TD 2003    TDAP 2022       PSH:  Past Surgical History:   Procedure Laterality Date    INSERT INTRAUTERINE DEVICE         Social History:  Social History     Socioeconomic History    Marital status:      Spouse name: Not on file    Number of children: Not on file    Years of education: Not on file    Highest education level: Not on file   Occupational History    Not on file   Tobacco Use    Smoking status: Never    Smokeless tobacco: Never    Vaping Use    Vaping Use: Never used   Substance and Sexual Activity    Alcohol use: No     Alcohol/week: 0.0 standard drinks of alcohol    Drug use: No    Sexual activity: Yes     Partners: Male     Birth control/protection: I.U.D.   Other Topics Concern    Caffeine Concern Yes    Exercise No    Seat Belt Yes    Special Diet Not Asked    Stress Concern Not Asked    Weight Concern Not Asked     Service Not Asked    Blood Transfusions Not Asked    Occupational Exposure Not Asked    Hobby Hazards Not Asked    Sleep Concern Not Asked    Back Care Not Asked    Bike Helmet Not Asked    Self-Exams Not Asked   Social History Narrative    Not on file     Social Determinants of Health     Financial Resource Strain: Not on file   Food Insecurity: Not on file   Transportation Needs: Not on file   Physical Activity: Not on file   Stress: Not on file   Social Connections: Not on file   Housing Stability: Not on file         Patient feels unsafe or threatened?: denies    Abuse: denies physical, sexual or mental.     Family History:  Family History   Problem Relation Age of Onset    Other (Lupus) Mother     Diabetes Mother        Health maintenance:  Mammogram (age 40 and q1-2yr): n/a  Colonoscopy (age 45 and q10yr): n/a    Review of Systems:  General: no complaints per category. See HPI for additional information.   Breast: no complaints per category. See HPI for additional information.   Respiratory: no complaints per category. See HPI for additional information.   Cardiovascular: no complaints per category. See HPI for additional information.   GI: no complaints per category. See HPI for additional information.   : no complaints per category. See HPI for additional information.   Heme: no complaints per category. See HPI for additional information.       Objective:     Vitals:    02/16/24 1331   BP: 116/78   Pulse: 105   Weight: 176 lb 2 oz (79.9 kg)         Body mass index is 29.31 kg/m².    General: AAO.NAD.   CVS  exam: normal peripheral perfusion  Chest: non-labored breathing, no tachypnea   Breast: symmetric, no dominant or suspicious mass, no skin or nipple changes and no axillary adenopathy  Abdominal exam:  soft, nontender, nondistended  Pelvic exam:   VULVA: normal appearing vulva with no masses, tenderness or lesions  PERINEUM: normal appearing, no lesions   URETHRAL MEATUS:  normal appearing, no lesions   VAGINA: normal appearing vagina with normal color and discharge, no lesions  CERVIX: normal appearing cervix without discharge or lesions. +2 IUD strings s/p removal.   UTERUS: uterus is normal size, shape, consistency and nontender  ADNEXA: normal adnexa in size, nontender and no masses  PERIRECTAL: normal appearing, no lesions   Ext: non-tender, no edema    Assessment:     Savanah Jansen is a 28 year old  female here for a well women exam.       Plan:       Problem List Items Addressed This Visit    None  Visit Diagnoses       Well woman exam with routine gynecological exam    -  Primary    Relevant Medications    Levonorgestrel (KYLEENA IU)    Encounter for IUD removal        Relevant Medications    Levonorgestrel (KYLEENA IU)            Cervical cancer screening  - discussion held with the patient about ASCCP guidelines  - repeat pap smear 2026   Health maintenance  - encouraged to maintain weight at healthy BMI  - discussed importance of exercise and healthy eating  - self breast exam instructions provided   Preconception counseling  - Recommend PNV daily   - pregnancy precautions provided   - IUD removed         Problem List Items Addressed This Visit    None  Visit Diagnoses       Well woman exam with routine gynecological exam    -  Primary    Relevant Medications    Levonorgestrel (KYLEENA IU)    Encounter for IUD removal        Relevant Medications    Levonorgestrel (KYLEENA IU)                  All of the findings and plan were discussed with the patient.  She notes understanding and agrees with  the plan of care.  All questions were answered to the best of my ability at this time.      RTC in 1 year for a well woman exam or sooner if needed     Alyssia Decker MD   [facility] - OBGYN      Discussed with patient that there will not be further notification of normal or benign results other than receiving results on mychart. A Rocket[employer]hart message or telephone call will be placed by the physician and/or office staff if results are abnormal.       Note to patient and family   The 21st Century Cures Act makes medical notes available to patients in the interest of transparency.  However, please be advised that this is a medical document.  It is intended as ntzm-kj-vuzr communication.  It is written and medical language may contain abbreviations or verbiage that are technical and unfamiliar.  It may appear blunt or direct.  Medical documents are intended to carry relevant information, facts as evident, and the clinical opinion of the practitioner.      This note could include assistance by Dragon voice recognition. Errors in content may be related to improper recognition by the system; efforts to review and correct have been done but errors may still exist.

## 2024-02-18 NOTE — PROCEDURES
Procedure Note: IUD removal     Preoperative Diagnosis:  IUD in place     Postoperative Diagnosis:  S/p IUD removal     Indications:  28 year old y/o  female with Kyleena IUD in placed since 2023 who presents for IUD removal per patient request.     Procedure:    A discussion was held with the patient about risks, benefits and alternatives for the procedure. The patient verbalized understanding and requested IUD removal. All questions and concerns addressed. Verbal and written consent was obtained.     The patient was placed in dorsal position with heels secured in stirrups. A sterile speculum was placed in the vagina. The cervix was visualized with 2 visible IUD strings noted from at the external os. Sterile ring forceps were then advanced towards the cervix and used to grasp the IUD strings. The IUD was then removed with the sterile forceps without difficulty or complications. The IUD appeared intact and was shown to the patient prior to be properly discarded. The patient reported she was doing well. All instrument were then removed from the vagina. The patient was advised Ibuprofen PRN for pain. Precautions provided to the patient. A discussion was held with the patient regarding contraception. The patient decided to try for pregnancy. The patient was advised to return to clinic in 1 year for a well woman exam or sooner if needed.     Condition: Stable    Disposition: RTC in 1 year or sooner    Alyssia Decker MD   [facility] - OBGYN    Discussed with patient that there will not be further notification of normal or benign results other than receiving results on Upplication. A Upplication message or telephone call will be placed by the physician and/or office staff if results are abnormal.     Note to patient and family   The 21st Century Cures Act makes medical notes available to patients in the interest of transparency.  However, please be advised that this is a medical document.  It is intended as wxly-bx-dhsr  communication.  It is written and medical language may contain abbreviations or verbiage that are technical and unfamiliar.  It may appear blunt or direct.  Medical documents are intended to carry relevant information, facts as evident, and the clinical opinion of the practitioner.

## 2024-05-20 ENCOUNTER — TELEPHONE (OUTPATIENT)
Dept: OBGYN CLINIC | Facility: CLINIC | Age: 29
End: 2024-05-20

## 2024-05-20 DIAGNOSIS — N91.2 AMENORRHEA: Primary | ICD-10-CM

## 2024-05-20 NOTE — TELEPHONE ENCOUNTER
Patient calling to initiate prenatal care  LMP 4/20/24  Patient is 7-8 weeks on 6/15/24  Confirmation Ultrasound and Appointment scheduled on   Future Appointments   Date Time Provider Department Center   6/14/2024 12:15 PM [facility] US [facility] [place]   6/14/2024  1:00 PM Rigo Cadena MD [facility] [place]   7/15/2024  2:00 PM Tonja Muhammad MD [facility] [place]   8/12/2024  2:00 PM Nga Barrow APN [facility] [place]         Any history of ectopic pregnancy? no  Any history of miscarriage? no  Any medications that you are taking on a regular basis other than prenatal vitamins? no (if not taking prenatal vitamins, encourage patient to start taking.)  Any bleeding since the first day of last LMP and your positive pregnancy test? no    Insurance bcbs o

## 2024-05-29 ENCOUNTER — TELEPHONE (OUTPATIENT)
Dept: OBGYN CLINIC | Facility: CLINIC | Age: 29
End: 2024-05-29

## 2024-05-29 NOTE — TELEPHONE ENCOUNTER
Called and spoke with pt. And  about pt's limited appetite and nausea, no vomiting though. ..  Pt. has US/ appointment. 6/14/24 with Dr. Whitehead.  Reviewed Prenatal list of Safe Medication List and N/V remedies ..  Recommended to keep diet simple and plaln, stay away from fried foods, fast food, rish foods.  Discussed trying Unisom/B6 remedy and Sea Bands .  Told pt. A lot of trial and error at this point..Try to at least take in fluids and stay hydrate and simple carbs like pasta, crackers, toast, etc. Proteins like simple chicken.  ER Precautions reviewed with pt. If not taking in anything for a 24 hour period or vomiting and unable to keep anything down. To ER for fluid replacement.  To call office with any questions or concerns.  Pt. Verbalizes understanding of info and agrees to plan.

## 2024-06-13 PROBLEM — Z30.430 ENCOUNTER FOR IUD INSERTION: Status: RESOLVED | Noted: 2023-01-17 | Resolved: 2024-06-13

## 2024-06-13 PROBLEM — L85.9 HYPERKERATOSIS: Status: RESOLVED | Noted: 2023-05-03 | Resolved: 2024-06-13

## 2024-06-13 PROBLEM — M79.89 SWELLING OF RIGHT FOOT: Status: RESOLVED | Noted: 2023-05-03 | Resolved: 2024-06-13

## 2024-06-13 PROBLEM — Z30.431 IUD CHECK UP: Status: RESOLVED | Noted: 2023-02-07 | Resolved: 2024-06-13

## 2024-06-14 ENCOUNTER — OFFICE VISIT (OUTPATIENT)
Dept: OBGYN CLINIC | Facility: CLINIC | Age: 29
End: 2024-06-14
Payer: COMMERCIAL

## 2024-06-14 ENCOUNTER — ULTRASOUND ENCOUNTER (OUTPATIENT)
Dept: OBGYN CLINIC | Facility: CLINIC | Age: 29
End: 2024-06-14

## 2024-06-14 VITALS
HEART RATE: 95 BPM | WEIGHT: 176 LBS | BODY MASS INDEX: 29 KG/M2 | SYSTOLIC BLOOD PRESSURE: 110 MMHG | DIASTOLIC BLOOD PRESSURE: 68 MMHG

## 2024-06-14 DIAGNOSIS — N91.2 AMENORRHEA: Primary | ICD-10-CM

## 2024-06-14 PROCEDURE — 76856 US EXAM PELVIC COMPLETE: CPT | Performed by: OBSTETRICS & GYNECOLOGY

## 2024-06-14 PROCEDURE — 3078F DIAST BP <80 MM HG: CPT | Performed by: OBSTETRICS & GYNECOLOGY

## 2024-06-14 PROCEDURE — 3074F SYST BP LT 130 MM HG: CPT | Performed by: OBSTETRICS & GYNECOLOGY

## 2024-06-14 PROCEDURE — 99213 OFFICE O/P EST LOW 20 MIN: CPT | Performed by: OBSTETRICS & GYNECOLOGY

## 2024-06-14 NOTE — PROGRESS NOTES
Subjective:  Patient presents complaining of no menses. Positive home pregnancy test.  LMP 4/20/24. No family history of any inheritable diseases or congenital birth defects on either side of family.    Objective:  /68   Pulse 95   Wt 176 lb (79.8 kg)   LMP 04/20/2024 (Exact Date)     Physical Examination:  General appearance: Well dressed, well nourished in no apparent distress  Neurologic/Psychiatric: Alert and oriented to person, place and time, mood normal, affect appropriate    Summary of Ultrasound Findings:  Ultrasound scan performed transabdominal.  LMP 4/20/24  7w6day by LMP;  8w3day by ultrasound  Viable intrauterine pregnancy  Dates consistent with ultrasound.  Final EDC:  1/25/25 by LMP consistent with ultrasound on 6/14/2024  Normal ovaries bilaterally.     Assessment/Plan:  Amenorrhea- Normal dating ultrasound  Follow up 3-4 weeks for new OB visit.  Prenatal vitamin with 0.4 mg folate, DHA.  Optional prenatal screening tests reviewed including cfdna, carrier screen/CF, NT/first trimester screen, Quad/AFP, Level 2 ultrasound, amniocentesis/CVS.  Bleeding precautions provided.    Diagnoses and all orders for this visit:    Amenorrhea  -     US PELVIS, ABDOMINAL [facility] ONLY       Return for New OB Visit.

## 2024-06-14 NOTE — PATIENT INSTRUCTIONS
Medications Safe in Pregnancy    The following over-the-counter medications may be taken safely after 12 weeks gestation by any patient who is pregnant.  Please follow the instructions on the package for adult dosage.  If you experience any symptoms prior to 12 weeks, please call the office at [phone].  (**these are all safe for you to use now. We ask that you use them sparingly in the first trimester and that you call us with any persistent concerns**)     Colds/Congestion: Flonase, Saline Nasal Spray, Neti Pot, Plain Robitussin, Robitussin DM, Mucinex DM, Vicks 44 E, Vicks Vapor rub, Cough drops without Zinc or Sudafed.   Contact your doctor if you have a persistent fever over 100.4, shortness of breath, coughing up green mucus, or a sore throat that persists from more than 3 days     Diarrhea: Imodium, Maalox Anti-Diarrheal or Kaopectate  Contact the office if you have diarrhea for more than 3 days.     Allergies: Benadryl, Claritin or Zyrtec     Hemorrhoids: Tucks pads, Preparation H, Annusol, Sitz bath or Witch hazel  Eat a high fiber diet and drink plenty of fluids.     Yeast: Monistat 3 or 7  Call if your symptoms do not improve, or if you experience vaginal bleeding, or a watery discharge.     Constipation: Metamucil, Colace, fiber supplement, Milk of Magnesia or Dulcolax  Drink plenty of fluids, eat high-fiber foods and take the above laxatives sporadically.      Headache or Mild aches and pain: Extra Strength Tylenol      Gas: Gas X, Gelusil, Papaya Tablets, Maalox, Mylicon or Mylanta Gas     Heartburn: Tums, Mylanta, Pepcid AC or Maalox     Sore throat: Alcohol free Chloraseptic spray or Lozenges      Nausea and Vomiting: ½ tablet Unisom plus 100mg of Vitamin B6 at bedtime (may increase B6 up to 200mg per day), Helen root tea or raspberry leaf tea     Insomnia: Tylenol PM, Vitamin B6 50mg, warm bath or milk, Unisom, Nytol or Sominex.      We have listed a few medications for common symptoms seen  in pregnancy.  Please contact the office if you are unsure about any over the counter medications that are not listed above.       Nausea and vomiting management in pregnancy     1. Half tablet of Unisom and 100 mg of Vitamin B6 at bedtime. Can repeat Vitamin B6 x1 in the morning or during the day  2. Small, frequent meals/snacks- especially important to include good proteins in your evening meal if you are feeling nauseated in the morning. This will keep your stomach full longer and reduce nausea.  3. Ginger tea, ginger ale, ginger chews, peppermint candies, hard candies  4. Keep a snack and or drink at your bedside to have before you even get out of bed in the morning  5. Sea Bands (https://www.seaGraphScienceband.com/) these can be worn all day/night as needed. The correct spot to wear them is 3 finger widths down from where your wrist bends on the inside     **if you find yourself unable to keep anything down for 24 hours or more, please go to one of the [facility] or the ER so that you can get IV fluids and medications**  Healthy Eating Habits During Pregnancy  It’s important to develop healthy eating habits while you are pregnant, for you as well as for your baby. Here are some ways to stay healthy.   Aim for a healthy weight  A slow, steady rate of weight gain is often best. After the first trimester, you may gain about a pound a week. If you were overweight before pregnancy, you need to gain fewer pounds. Your healthcare provider can give you a healthy weight goal for your pregnancy.   Don’t diet  Now is not the time to diet. You may not get enough of the nutrients you and your baby need. Instead, learn how to be a healthy eater. Start by doing it for your baby. Soon, you may do it for yourself.   Vitamins and supplements  Talk with your healthcare provider about taking these and other prenatal vitamins and supplements.   Iron makes the extra blood you need now.  Calcium and vitamin D help build and  keep strong bones.  Folic acid helps prevent certain birth defects.  Iodine helps the thyroid work right.  Some vitamins may not be safe to take. Your healthcare provider will tell you which ones to avoid.  Fluids  Drink at least 8 to 10 cups of fluid daily. Your baby needs fluids. Fluids also decrease constipation, flush out toxins and waste, limit swelling, and help prevent bladder infections. Water is best. Other good choices are:   Water or seltzer water with a slice of lemon or lime. (These can also help ease an upset stomach.)  Clear soups that are low in salt  Low-fat or fat-free milk, soy or rice milk with calcium added  Popsicles or gelatin  Things to avoid  Some things might harm your growing baby. Don’t eat or drink:   Alcohol  Unpasteurized dairy foods and juices  Raw or undercooked meat, poultry, fish, or eggs  Unwashed fruits and vegetables  Prepared meats, like deli meats or hot dogs, unless heated until steaming hot  Fish that are high in mercury, like shark, swordfish, tobi mackerel, tilefish, and albacore tuna  Things to limit  Ask your healthcare provider whether it’s safe to eat or drink:   Caffeine  Artificial sweeteners  Organ meats  Certain types of fish  Fish and shellfish that contain mercury in lower amounts, like shrimp, canned light tuna, salmon, pollock, and catfish      Foods to Avoid During Pregnancy  Deli Meats- You may eat deli meats from the deli.  If you eat deli meats in the package, it may be contaminated with Listeria. Heat all deli meats in a package to 165 degrees Fahrenheit before eating, even if the label states the meat is “pre-cooked”.    Soft Cheeses and Unpasteurized Products - You may eat soft cheeses that are pasteurized.  Please avoid all soft cheeses, milk or juice that is unpasteurized.  Fish- avoid fish that contains a high level of mercury. These include but are not limited to; [place], [place], Tile Fish, Shark, Swordfish, and Tobi Mackerel. Please see chart  below for good fish choices in pregnancy. Also avoid any raw, uncooked shellfish such as oysters, clams, or sushi.  Make sure to cook all meats; including ground beef, pork, and poultry to their desired temperatures before consuming. This reduces the chance of a food borne illness.  Caffeine- limit to 200 mg or an 8oz cup daily or less.   Alcohol- no amount of alcohol is determined to be safe in pregnancy. Do not drink any alcoholic beverages while pregnant.            [facility]- Prenatal Testing    The following information is designed to help you understand some of the optional tests that are available to you to screen for problems in your pregnancy.  Before considering any of these tests, you will need to consider the following questions:    [1] What would you do if an abnormality were detected?  If the answer is nothing, then you may decide to decline all testing.  [2] Would you be willing to undergo testing which might increase your risk of miscarriage, such as an amniocentesis or CVS (see below)?  [3] If you have the initial testing, and it indicates that there might be a problem, and you subsequently decide not to do invasive testing such as an amniocentesis, would you worry excessively through the remainder of the pregnancy?    The following tests are available to screen for problems in your pregnancy:    [1]  First Trimester Screening (also called Nuchal Thickness, Nuchal Translucency or NT)- This is an abdominal ultrasound done between 10 and 14 weeks by a perinatology specialist (Maternal Fetal Medicine, MFM) which measures the thickness of the skin behind your baby's neck.  Increased thickness of the skin in this area has been linked to an increased risk of genetic abnormalities like Down Syndrome.  A second visit may be required if the baby is not in the correct position.  You will need to schedule an appointment with the Maternal Fetal Medicine office.  Address and phone number  below:  Please verify insurance coverage:  CPT code: 81589 (diaz) or 26265 (twins)  Diagnosis: Genetic screening- Z36.8A  Maternal Fetal Medicine  Dr. Douglass, Dr. Bedoya, Dr. Lee, and Dr. Monahan  [address] [place] [address] [address] [place] [address]  Phone [phone]  Fax [phone]    [2] Cell-Free DNA testing (NIPT)- This is a blood test done any time after 10-11 weeks which screens for genetic abnormalities like Down Syndrome.  It is greater than 98% sensitive but requires an amniocentesis for confirmation if abnormal.  It can also tell you the sex of the baby.  Please see the included pamphlet for more information about cost and billing options.  CPT code: 56240  Diagnosis code: Genetic screening- 36.8A    [3] Quad Screen (also called AFP-Plus or Tetra Screen)- This is a blood test done which screens for both genetic abnormalities like Down Syndrome and neural tube defects (NTD's), such as spina bifida (an abnormal opening in the spine which can cause paralysis).  It is usually performed around 16-20 weeks.  If the Quad screen comes back abnormal, it does not mean that your baby definitely has an abnormality.  It only means that there is an increased risk of an abnormality.  Additional testing such as a Level II ultrasound or amniocentesis may be recommended (see below).  This test is less accurate at picking up genetic abnormalities than the cell-free DNA test.  If you have test #1 or 2, then usually only the AFP part of the Quad screen would be performed to screen for NTD's (see below).    [4]  Alpha Fetoprotein (AFP, MSAFP)- This is a simple blood test that screens for neural tube defects such as spina bifida (an abnormal opening in the spine).  It is usually performed around 16-20 weeks.  Additional testing such as a Level II ultrasound may be recommended for an abnormal test result.  Please verify insurance coverage:  CPT code: 63941 Diagnosis code: Genetic Screening- Z36.8A    [5] Cystic Fibrosis/SMA  Carrier/Fragile X Carrier Screening- Cystic Fibrosis is a genetic disease which causes lung problems in the infant.  SMA (spinal muscular atrophy) is a genetic disease that affects the nerve cells of the spinal cord.  Fragile X is the most common cause of mental disabilities.  These genetic defects would need to be passed from both parents to the child.  A blood test is performed on the mother, and if her test is positive, then the father should also be tested. These tests can be done at any time in the pregnancy, usually in the first trimester with your initial OB labs.  All babies are screened for cystic fibrosis after birth.  Please verify insurance coverage:  Cystic Fibrosis CPT Code: 99412 Diagnosis code: Cystic Fibrosis screening- Z13.228  SMA CPT Code: 09999 Diagnosis code: Genetic Screening- Z36.8A or Testing for Genetic Disease Carrier- Z13.71    [6]  Level II Ultrasound-  This is an abdominal ultrasound done at approximately 20-21 weeks by a perinatology specialist (ANN-MARIE) at [facility] which looks at many of the baby's internal structures.  Abnormalities in certain structures have been associated with an increased risk of genetic abnormalities.  It also screens for NTD's.  This ultrasound would replace the Level I Ultrasound that we normally perform at our office around the same time.    [7]  Amniocentesis-  During this procedure, a needle is passed through your abdominal wall to withdrawal some amniotic fluid from around the baby.  It screens for both genetic abnormalities and NTD's and is usually performed around 15-16 weeks.  This test has the highest accuracy for detecting genetic abnormalities, but there may be a small risk of miscarriage or other complications.  A similar procedure called Chorionic Villus Sampling (CVS) is performed by passing a catheter through the vagina to remove a small sample of tissue from the placenta (afterbirth).  CVS may have a higher risk of miscarriage and other rare  complications compared to amniocentesis but can be performed earlier in pregnancy.  Amniocentesis is often recommended/offered if any of the previously mentioned tests come back abnormal.  A pamphlet is available if you would like more information about this option.  If you decide to have an amniocentesis, the other tests would be unnecessary.      The above information covers some of the basics.  Pamphlets on the Cell-Free DNA test, Quad Screen and Cystic Fibrosis test should be in your prenatal packet.  Your doctor will discuss this information in more detail, and feel free to ask additional questions.  Also, remember that all of these tests are optional, and will only be performed at your request.  Testing that needs to be done through the perinatologist's office may require 2-3 weeks lead time to schedule.              PRENATAL INSTRUCTIONS    These prenatal instructions are specific to the care of our patients and supplement the books and pamphlets you are receiving.  The physicians and office staff are also available to answer questions not covered in this material.  We function as a group practice with physicians alternating call for deliveries.  For this reason you should see each of the doctors at least once during your pregnancy.  We provide continuous coverage of our patients and are committed to competent and concerned patient care.    PRENATAL CARE  Because of the importance of good prenatal care, you will have monthly office visits until the 28th week of pregnancy, then every two weeks for the next eight weeks and weekly during the final month.  Certain conditions may require more frequent visits.    MEDICATION  You will be given a prescription for prenatal vitamins with DHA to be taken until your six week postpartum visit or until you stop breastfeeding, whichever lasts longer.  These vitamins can occasionally exacerbate stomach irritation or constipation;  please contact us if you are having  difficulties tolerating your vitamin.  Should you become anemic during your pregnancy, you may receive a supplemental iron tablet to correct the condition.  Unnecessary medication should be avoided, but you may take Tylenol (Acetaminophen, regular or extra-strength) for ordinary headaches and Tums, Mylanta or Maalox for heartburn.  For cough or cold symptoms you may try Contac, Benadryl, Tylenol Sinus, Chlor-Trimeton, Actifed, Sudafed or plain Robitussin.  For constipation drink 6 to 8 glasses of water each day and increase your roughage intake with foods such as lettuce, bran, wheat bread and green vegetables.  You may take Metamucil, Colace or Senekot if the above do not work.  Please call the office to confirm the safety of any other prescription, over-the-counter or herbal medications.\Alcohol intake and tobacco should be avoided during pregnancy.  Caffeinated beverages (coffee, tea and soft drinks) should be limited to no more than 2 cups daily.  Pregnant women should consume the following each day through diet or supplements:  o Folic acid 400-800 micrograms   o Iron 30 mg (or be screened for anemia)  o Vitamin D 600 international units  o Calcium 1,000 mg    NUTRITION/HEALTH  Gaining weight is the easiest thing for a pregnant woman to do.  A well balanced diet of lean meat, fish, poultry, non-starchy vegetables, fruit and salads should accomplish an average weight gain of 2 to 4 pounds a month.  Avoid raw or undercooked fish or meats.  If you have deli meat, please microwave it or cook on the stove until steaming.  Soft cheeses (Camembert, Blue, Roquefort, Goat), all non-pasteurized foods, and large game fish (Shark, Swordfish, Tobi Mackerel, Tilefish, [place]) should be avoided, because of concerns about mercury exposure.  Other fish including canned light tuna should be limited to 2 servings per week.  Albacore (“white”) tuna should be limited to once per week.  Avoid cigarette smoking, alcohol, drugs, and  vaping.  If you have deli meat, please microwave it or cook on the stove until steaming.  Caffeine: Low-to-moderate caffeine intake in pregnancy does not appear to be associated with any adverse outcomes. Pregnant women may have caffeine but should probably limit it to less than 300 mg/d (a typical 8-ounce cup of brewed coffee has approximately 130 mg of caffeine. An 8-ounce cup of tea or 12-ounce soda has approximately 50 mg of caffeine), but exact amounts vary based on the specific beverage or food.  Avoid hot tub use.  Hair dye is presumed safe, but there are no randomized trials.  Insect Repellants: Topical insect repellants (including DEET) can be used in pregnancy and should be used in areas with high risk for insect-borne illnesses.  Recommended weight gain goals:   Pre-pregnancy BMI     Recommended Weight Gain   Underweight < 18.5     28-40 lbs   Normal 18.5 - 24.9     25-35 lbs   Overweight 25 - 29.9     15-25 lbs   Obese 30 or greater     11-20 lbs    PHYSICAL ACTIVITY  We encourage our patients to continue physical activities and exercise during their pregnancy.  We recommend activities such as swimming, stationary biking, walking, low-impact aerobics, tennis, and golf.  Other activities such as diving, bicycle riding, water/snow-skiing, horseback riding and flying in small aircraft should be avoided.  If you choose to exercise, your heart rate should not increase to more than 140 beats per minute or 70% of your maximum heart rate.  We recommend that you exercise for no more than 30 minutes at one time.  Saunas, Jacuzzis and tanning beds should be avoided.  Sexual intercourse is permitted in the absence of bleeding,  labor or any other complications of pregnancy.    IF YOU ARE INVOLVED IN A  MINOR CAR ACCIDENT OR HAVE A MINOR FALL, please contact our doctor on call at [phone].  Your phone call will transfer to the answering service if the office is closed.    [facility]  sponsors regular childbirth classes and we strongly recommend a class for both expectant parents.  Other popular classes include Breastfeeding, Infant CPR and Infant Care.  For more information go to [email or web address]--> Services-->Pregnancy and baby-->Tour, Classes and Events.      LABOR (at term, 37 weeks or more)  Weekdays, please call the Office at [phone].  At night and on weekends/holidays, please call Labor & Delivery at [phone].  If you have any of the following symptoms: contractions 5 to 6 minutes apart for one hour, or sooner if you have a history of fast labors, broken bag of water with or without contractions (gush or persistent slow leakage of fluid), any vaginal bleeding other than a slight bloody show following an internal exam or light blood-tinged mucous discharge, you should go directly to Labor & Delivery at [facility].  The Labor & Delivery staff will contact the doctor on call and notify him or her of your labor status. AVOID HEAVY MEALS IF YOU SUSPECT THE ONSET OF LABOR     LABOR (less than 37 weeks)  Please call the office at [phone] right away if you experience contractions more frequently than every 10 to 15 minutes for more than an hour, or any unusual low back pain or pelvic pressure.  Also use this number if you need to speak to the physician about any urgent medical problems after business hours.    HOSPITAL  The delivery of your baby will take place at [facility], located at [address] [place] [address].      PEDIATRICIAN  You will need to choose a pediatrician or family practitioner to take care of your baby both while you are in the hospital and after you go home.  If the physician you choose is not on staff at [facility], you will also need to select a physician to see the baby while you are in the hospital.  Please make sure that any physician you choose accepts your insurance plan and is accepting new patients.  Feel free to ask us for a  recommendation if you are not familiar with the doctors in this area.    DELIVERY  There are three types of anesthetics available for use during labor: short acting narcotics (Stadol, Nubain) given through your IV, local anesthetics injected to numb your perineum during delivery, and the epidural injection to numb you from the waist down during labor and delivery. The above choices are individualized, and we will work with you to make your labor experience as comfortable as possible.  Communication is extremely important during this process, so we encourage you to ask questions and discuss any concerns.  We believe childbirth is a natural process, and we hope to make your experience as wonderful as possible!   NAUSEA AND VOMITING IN PREGNANCY    This pamphlet contains some useful information if you are experiencing troublesome morning (or all day) sickness.  Morning sickness is the result of high hormone levels in pregnancy.  It usually starts around 5-7 weeks and starts to improve around 10-12 weeks.  Some suggestions on how to decrease your nausea are listed below, with simplest remedies listed first.    DIETARY SUGGESTIONS:  Every patient will have particular foods or drinks that really worsen the nausea.  Trial and error is the most important rule.  We recommend frequent, small meals.  Try eating staci or saltine crackers, especially first thing in the morning.  Many patients find that spicy foods, greasy foods and foods with a strong odor are the worst.  To minimize odors, eat foods cold if possible and use a fan or open window in the kitchen (or avoid cooking altogether!).  Try drinking liquids ice cold in a plastic cup with a lid and straw.    HERBAL REMEDIES:  Seabands/relief bands/accupressure bands (www.reliefband.com)  Helen 250 mg three times daily  Vitamin B6 10-25 mg three to four times daily, maximum 200 mg daily    OVER-THE-COUNTER MEDICATIONS (use only one of these at a time):  Dramamine  mg  up to four times daily  Unisom 12.5 mg up to four times daily  Benadryl 25 mg three to four times daily (may cause fatigue)    PRENATAL VITAMINS:  Prenatal vitamins can worsen nausea, primarily because they contain iron.  If you find that the prenatal vitamin is making things worse, there are several things you can try.  First, ask us for a different brand of vitamin.  Patients who have problems with one brand of vitamin often find that a different brand works better.  Second, try taking the vitamin at a different time of day, or splitting it in half and taking half in the morning and half at night.  Third, there is a \"mini-vitamin\" called Premesis that contains only folate, calcium and vitamin B6, which you can take until the nausea subsides.  You can also try taking two Flintstones vitamins daily if all else fails.    If you are unable to keep anything down, including liquids, for more than 24-36 hours, please contact our office.  Keep in mind that the baby's caloric requirements early in pregnancy are fairly small, and babies do a good job of getting all the nutrients they need from your body stores.    Finally, if none of the above suggestions help or you are losing significant amounts of weight (usually more than 7-10 pounds), there are several prescription medications that may also help.  Feel free to call us if you think this may be necessary.    As a final bit of reassurance, some studies have shown that pregnancies with severe morning sickness often have a better than usual outcome... Probably because a healthy pregnancy produces lots of hormones... And thus lots of nausea!    GOOD LUCK!

## 2024-07-15 ENCOUNTER — TELEPHONE (OUTPATIENT)
Dept: OBGYN CLINIC | Facility: CLINIC | Age: 29
End: 2024-07-15

## 2024-07-15 ENCOUNTER — INITIAL PRENATAL (OUTPATIENT)
Dept: OBGYN CLINIC | Facility: CLINIC | Age: 29
End: 2024-07-15
Payer: COMMERCIAL

## 2024-07-15 VITALS
WEIGHT: 179.25 LBS | SYSTOLIC BLOOD PRESSURE: 114 MMHG | DIASTOLIC BLOOD PRESSURE: 72 MMHG | HEART RATE: 122 BPM | BODY MASS INDEX: 30 KG/M2

## 2024-07-15 DIAGNOSIS — Z36.8A ENCOUNTER FOR ANTENATAL SCREENING FOR OTHER GENETIC DEFECT (HCC): ICD-10-CM

## 2024-07-15 DIAGNOSIS — Z83.3 FAMILY HISTORY OF GESTATIONAL DIABETES: ICD-10-CM

## 2024-07-15 DIAGNOSIS — Z34.01 ENCOUNTER FOR SUPERVISION OF NORMAL FIRST PREGNANCY IN FIRST TRIMESTER (HCC): Primary | ICD-10-CM

## 2024-07-15 PROBLEM — E66.3 OVERWEIGHT (BMI 25.0-29.9): Status: ACTIVE | Noted: 2024-07-15

## 2024-07-15 PROBLEM — M25.571 SINUS TARSI SYNDROME OF RIGHT FOOT: Status: RESOLVED | Noted: 2023-05-03 | Resolved: 2024-07-15

## 2024-07-15 PROCEDURE — 87591 N.GONORRHOEAE DNA AMP PROB: CPT | Performed by: OBSTETRICS & GYNECOLOGY

## 2024-07-15 PROCEDURE — 87186 SC STD MICRODIL/AGAR DIL: CPT | Performed by: OBSTETRICS & GYNECOLOGY

## 2024-07-15 PROCEDURE — 87086 URINE CULTURE/COLONY COUNT: CPT | Performed by: OBSTETRICS & GYNECOLOGY

## 2024-07-15 PROCEDURE — 87088 URINE BACTERIA CULTURE: CPT | Performed by: OBSTETRICS & GYNECOLOGY

## 2024-07-15 PROCEDURE — 87491 CHLMYD TRACH DNA AMP PROBE: CPT | Performed by: OBSTETRICS & GYNECOLOGY

## 2024-07-15 NOTE — TELEPHONE ENCOUNTER
ERIBERTO 01/25/2025 - currently 12w2d   Patient requesting Panorama and horizon.     Patient called for follow up.   No answer, voicemail left with instructions to call back.   Office number provided.

## 2024-07-15 NOTE — PROGRESS NOTES
Savanah Jansen is a 29 year old  at 12w2d here for initial prenatal visit with our group.    Patient's last menstrual period was 2024 (exact date).     2025, by Last Menstrual Period      OB History    Para Term  AB Living   1 0 0 0 0 0   SAB IAB Ectopic Multiple Live Births   0 0 0 0 0      # Outcome Date GA Lbr Jim/2nd Weight Sex Type Anes PTL Lv   1 Current                Past Medical History:   Diagnosis Date    Sinus tarsi syndrome of right foot 2023        Past Surgical History:   Procedure Laterality Date    Insert intrauterine device          Social History     Socioeconomic History    Marital status:    Tobacco Use    Smoking status: Never    Smokeless tobacco: Never   Vaping Use    Vaping status: Never Used   Substance and Sexual Activity    Alcohol use: No     Alcohol/week: 0.0 standard drinks of alcohol    Drug use: No    Sexual activity: Yes     Partners: Male   Other Topics Concern    Caffeine Concern No    Exercise No    Seat Belt Yes     Social Determinants of Health     Financial Resource Strain: Low Risk  (2024)    Financial Resource Strain     Difficulty of Paying Living Expenses: Not hard at all     Med Affordability: No   Food Insecurity: No Food Insecurity (2024)    Food Insecurity     Food Insecurity: Never true   Transportation Needs: No Transportation Needs (2024)    Transportation Needs     Lack of Transportation: No   Stress: No Stress Concern Present (2024)    Stress     Feeling of Stress : No   Housing Stability: Low Risk  (2024)    Housing Stability     Housing Instability: No        ROS:   Constitutional: Negative for activity change, appetite change, chills, fatigue, fever and unexpected weight change.   HEENT: Negative for headaches, vision changes, congestion, epistaxis.    Respiratory: Negative for shortness of breath and wheezing.    Cardiovascular: Negative for chest pain or palpitations.   Gastrointestinal:  Negative for vomiting, abdominal pain, and abdominal distention.   Genitourinary: Negative for dysuria, frequency, hematuria, vaginal discharge or bleeding, difficulty urinating, genital sores, pain  Skin: Negative for wound.   Neurological: Negative for dizziness, seizures, numbness and headaches.   Psychiatric/Behavioral: Negative for depression, anxiety, SI/HI.       Physical Exam:  Vitals:    07/15/24 1410   BP: 114/72   Pulse: (!) 122      Body mass index is 29.83 kg/m².     Gen: AAOx3, NAD  Resp: breathing comfortably  Abdomen: gravid, soft, nontender  Ext: nontender, no edema       FHR: 155-160      A/P:  Patient Active Problem List   Diagnosis    Overweight (BMI 25.0-29.9)        Prenatal course and care discussed with patient: visits, routine labs and US, plan for delivery. Prenatal labs ordered today.  Genetic screening options discussed:  NIPT with cell-free DNA, first trimester screen, or diagnostic testing. Additionally offered carrier screening. She desires NIPT and carrier screening.   Immunizations: tdap >27wks, flu in season  Breast and cervical cancer screening completed - pap UTD 2023  Provided with educational information on dietary restrictions, exercise during pregnancy, and goal total weight gain. We reviewed avoidance of exposures to tobacco/marijuana, alcohol, and illicit substances.     Follow up visit in 4 weeks, sooner prn.

## 2024-07-16 LAB
C TRACH DNA SPEC QL NAA+PROBE: NEGATIVE
N GONORRHOEA DNA SPEC QL NAA+PROBE: NEGATIVE

## 2024-07-18 DIAGNOSIS — O23.41: Primary | ICD-10-CM

## 2024-07-18 RX ORDER — CEPHALEXIN 500 MG/1
500 CAPSULE ORAL 4 TIMES DAILY
Qty: 20 CAPSULE | Refills: 0 | Status: SHIPPED | OUTPATIENT
Start: 2024-07-18 | End: 2024-07-23

## 2024-07-23 ENCOUNTER — LAB ENCOUNTER (OUTPATIENT)
Dept: LAB | Age: 29
End: 2024-07-23
Attending: OBSTETRICS & GYNECOLOGY
Payer: COMMERCIAL

## 2024-07-23 DIAGNOSIS — Z83.3 FAMILY HISTORY OF GESTATIONAL DIABETES: ICD-10-CM

## 2024-07-23 DIAGNOSIS — Z34.01 ENCOUNTER FOR SUPERVISION OF NORMAL FIRST PREGNANCY IN FIRST TRIMESTER (HCC): ICD-10-CM

## 2024-07-23 LAB
ANTIBODY SCREEN: NEGATIVE
BASOPHILS # BLD AUTO: 0.03 X10(3) UL (ref 0–0.2)
BASOPHILS NFR BLD AUTO: 0.3 %
EOSINOPHIL # BLD AUTO: 0.1 X10(3) UL (ref 0–0.7)
EOSINOPHIL NFR BLD AUTO: 1 %
ERYTHROCYTE [DISTWIDTH] IN BLOOD BY AUTOMATED COUNT: 12.8 %
HCT VFR BLD AUTO: 40.8 %
HGB BLD-MCNC: 13.9 G/DL
IMM GRANULOCYTES # BLD AUTO: 0.05 X10(3) UL (ref 0–1)
IMM GRANULOCYTES NFR BLD: 0.5 %
LYMPHOCYTES # BLD AUTO: 2.31 X10(3) UL (ref 1–4)
LYMPHOCYTES NFR BLD AUTO: 24.2 %
MCH RBC QN AUTO: 32.2 PG (ref 26–34)
MCHC RBC AUTO-ENTMCNC: 34.1 G/DL (ref 31–37)
MCV RBC AUTO: 94.4 FL
MONOCYTES # BLD AUTO: 0.58 X10(3) UL (ref 0.1–1)
MONOCYTES NFR BLD AUTO: 6.1 %
NEUTROPHILS # BLD AUTO: 6.49 X10 (3) UL (ref 1.5–7.7)
NEUTROPHILS # BLD AUTO: 6.49 X10(3) UL (ref 1.5–7.7)
NEUTROPHILS NFR BLD AUTO: 67.9 %
PLATELET # BLD AUTO: 261 10(3)UL (ref 150–450)
RBC # BLD AUTO: 4.32 X10(6)UL
RH BLOOD TYPE: NEGATIVE
WBC # BLD AUTO: 9.6 X10(3) UL (ref 4–11)

## 2024-07-23 PROCEDURE — 86787 VARICELLA-ZOSTER ANTIBODY: CPT

## 2024-07-23 PROCEDURE — 86850 RBC ANTIBODY SCREEN: CPT

## 2024-07-23 PROCEDURE — 83020 HEMOGLOBIN ELECTROPHORESIS: CPT

## 2024-07-23 PROCEDURE — 83036 HEMOGLOBIN GLYCOSYLATED A1C: CPT

## 2024-07-23 PROCEDURE — 87389 HIV-1 AG W/HIV-1&-2 AB AG IA: CPT

## 2024-07-23 PROCEDURE — 36415 COLL VENOUS BLD VENIPUNCTURE: CPT

## 2024-07-23 PROCEDURE — 86762 RUBELLA ANTIBODY: CPT

## 2024-07-23 PROCEDURE — 83021 HEMOGLOBIN CHROMOTOGRAPHY: CPT

## 2024-07-23 PROCEDURE — 86901 BLOOD TYPING SEROLOGIC RH(D): CPT

## 2024-07-23 PROCEDURE — 87340 HEPATITIS B SURFACE AG IA: CPT

## 2024-07-23 PROCEDURE — 86900 BLOOD TYPING SEROLOGIC ABO: CPT

## 2024-07-23 PROCEDURE — 86803 HEPATITIS C AB TEST: CPT

## 2024-07-23 PROCEDURE — 86780 TREPONEMA PALLIDUM: CPT

## 2024-07-23 PROCEDURE — 85025 COMPLETE CBC W/AUTO DIFF WBC: CPT

## 2024-07-24 ENCOUNTER — PATIENT MESSAGE (OUTPATIENT)
Dept: OBGYN CLINIC | Facility: CLINIC | Age: 29
End: 2024-07-24

## 2024-07-24 PROBLEM — O26.899 RH NEGATIVE STATE IN ANTEPARTUM PERIOD (HCC): Status: ACTIVE | Noted: 2024-07-24

## 2024-07-24 PROBLEM — Z67.91 RH NEGATIVE STATE IN ANTEPARTUM PERIOD (HCC): Status: ACTIVE | Noted: 2024-07-24

## 2024-07-24 LAB
EST. AVERAGE GLUCOSE BLD GHB EST-MCNC: 85 MG/DL (ref 68–126)
HBA1C MFR BLD: 4.6 % (ref ?–5.7)
HBV SURFACE AG SER-ACNC: <0.1 [IU]/L
HBV SURFACE AG SERPL QL IA: NONREACTIVE
HCV AB SERPL QL IA: NONREACTIVE
HGB A2 MFR BLD: 2.7 % (ref 1.5–3.5)
HGB PNL BLD ELPH: 97.3 % (ref 95.5–100)
RUBV IGG SER QL: POSITIVE
RUBV IGG SER-ACNC: 37 IU/ML (ref 10–?)
T PALLIDUM AB SER QL IA: NONREACTIVE
VZV IGG SER IA-ACNC: 204.9 (ref 165–?)

## 2024-07-25 ENCOUNTER — TELEPHONE (OUTPATIENT)
Dept: OBGYN CLINIC | Facility: CLINIC | Age: 29
End: 2024-07-25

## 2024-07-25 NOTE — TELEPHONE ENCOUNTER
Patient states that she is currently around 14 weeks pregnant.  The patient just finished UTI medication and is experiencing vaginal itching and a white discharge.  Patient needs to know if she should be re-checked for infection.   Please advise.

## 2024-07-25 NOTE — TELEPHONE ENCOUNTER
From: Savanah Jansen  To: Tonja Muhammad  Sent: 7/24/2024 4:33 PM CDT  Subject: Adding Blood Type Testing to NIPT     Hi,    I got a message from Doctor Jb About my recent bloodwork results and it said that we could possibly get blood typing added to my NIPT testing.     Do You change that on your side? I’m having my blood drawn Friday for the [employer]era test so I hope we can add this soon.    Also, I never received a box for the carrier screening, only the NIPT test.    Thank you,

## 2024-07-25 NOTE — TELEPHONE ENCOUNTER
Called and spoke to [employer] customer service.   Requested fetal Rh testing be added to [facility] testing.     Paperwork sent via fax for completion. Awaiting paperwork at this time.   Confirmed order for [facility].     MCM sent to patient for follow up.

## 2024-07-26 NOTE — TELEPHONE ENCOUNTER
Form received - requested add on fetal RH signed by Dr. Cadena and faxed to [facility] as directed on 07/26 at 1100

## 2024-07-26 NOTE — TELEPHONE ENCOUNTER
Called to follow up with patient.     Patient states has been experiencing vaginal itching and white discharge after finishing UTI antibiotics. Has never had a yeast infection for comparison. Endorses vaginal irritation while wiping.    Recommended to try OTC monistat treatment, 5 or 7 day treatments.   Advised patient to call our office back if symptoms are not improving or worsening for in office evaluation. Agreeable to plan.   All questions answered.

## 2024-07-26 NOTE — TELEPHONE ENCOUNTER
Called to follow up with [facility].     Requested to re fax the authorization form to [facility] for add on fetal Rh testing.

## 2024-07-27 ENCOUNTER — PATIENT MESSAGE (OUTPATIENT)
Dept: OBGYN CLINIC | Facility: CLINIC | Age: 29
End: 2024-07-27

## 2024-07-29 NOTE — TELEPHONE ENCOUNTER
From: Savanah Jansen  To: Tonja Muhammad  Sent: 7/27/2024 7:23 AM CDT  Subject: Light Spotting    I called the office the other day to talk to a nurse about discharge and they said it’s probably a yeast infection that resulted from the UTI antibiotics I was taking.    I started using monistat 7 as directed by the nurse for the first time last night.     I used the monistat internally and then in the middle of the night I had to go to the bathroom and had very light spotting(4 light streaks) but it looks like it stopped because I didn’t see any this morning.    I wanted to reach out to check with you all because we found out my  is A+ and I’m O- so Dr. Muhammad in her last message she mentioned if there’s spotting I need the RH shots (but we don’t know what type the baby is yet, we’ve added that to NIPT testing but haven’t had the test done yet)

## 2024-07-29 NOTE — TELEPHONE ENCOUNTER
Called to follow up with patient.   14w2d with Estimated Date of Delivery: 1/25/25     Patient reports one more episode of very light spotting. Pink tinged when wiping this morning.     Believes it may be correlated to her using the vaginal suppositories for yeast infection, due to plastic applicator. Wanted to confirm she didn't need to follow up with Rhogam injection, awaiting fetal RH testing at this time.     No cramping or pelvic abdominal pain.     Routed to provider for additional review.

## 2024-08-08 LAB — AMB EXT NATERA CARRIER SCREEN: NEGATIVE

## 2024-08-12 ENCOUNTER — LAB ENCOUNTER (OUTPATIENT)
Dept: LAB | Age: 29
End: 2024-08-12
Attending: NURSE PRACTITIONER
Payer: COMMERCIAL

## 2024-08-12 ENCOUNTER — ROUTINE PRENATAL (OUTPATIENT)
Dept: OBGYN CLINIC | Facility: CLINIC | Age: 29
End: 2024-08-12
Payer: COMMERCIAL

## 2024-08-12 VITALS
SYSTOLIC BLOOD PRESSURE: 110 MMHG | WEIGHT: 182 LBS | HEART RATE: 73 BPM | DIASTOLIC BLOOD PRESSURE: 72 MMHG | BODY MASS INDEX: 30 KG/M2

## 2024-08-12 DIAGNOSIS — Z34.01 ENCOUNTER FOR SUPERVISION OF NORMAL FIRST PREGNANCY IN FIRST TRIMESTER (HCC): ICD-10-CM

## 2024-08-12 DIAGNOSIS — Z36.8A ENCOUNTER FOR ANTENATAL SCREENING FOR OTHER GENETIC DEFECT (HCC): Primary | ICD-10-CM

## 2024-08-12 DIAGNOSIS — Z36.8A ENCOUNTER FOR ANTENATAL SCREENING FOR OTHER GENETIC DEFECT (HCC): ICD-10-CM

## 2024-08-12 DIAGNOSIS — Z87.440 HISTORY OF UTI: ICD-10-CM

## 2024-08-12 LAB
APPEARANCE: CLEAR
BILIRUBIN: NEGATIVE
GLUCOSE (URINE DIPSTICK): NEGATIVE MG/DL
KETONES (URINE DIPSTICK): NEGATIVE MG/DL
MULTISTIX LOT#: ABNORMAL NUMERIC
NITRITE, URINE: NEGATIVE
OCCULT BLOOD: NEGATIVE
PH, URINE: 6.5 (ref 4.5–8)
PROTEIN (URINE DIPSTICK): NEGATIVE MG/DL
SPECIFIC GRAVITY: 1.02 (ref 1–1.03)
URINE-COLOR: YELLOW
UROBILINOGEN,SEMI-QN: 0.2 MG/DL (ref 0–1.9)

## 2024-08-12 PROCEDURE — 36415 COLL VENOUS BLD VENIPUNCTURE: CPT

## 2024-08-12 PROCEDURE — 87086 URINE CULTURE/COLONY COUNT: CPT | Performed by: NURSE PRACTITIONER

## 2024-08-12 PROCEDURE — 81002 URINALYSIS NONAUTO W/O SCOPE: CPT | Performed by: NURSE PRACTITIONER

## 2024-08-12 PROCEDURE — 3074F SYST BP LT 130 MM HG: CPT | Performed by: NURSE PRACTITIONER

## 2024-08-12 PROCEDURE — 3078F DIAST BP <80 MM HG: CPT | Performed by: NURSE PRACTITIONER

## 2024-08-12 PROCEDURE — 82105 ALPHA-FETOPROTEIN SERUM: CPT

## 2024-08-12 NOTE — PROGRESS NOTES
CHER[facility]  FM- none  Doing well, no immediate concerns  No complaints. No LOF/VB/uctx  RH negative  Genetic testing- MS AFP ordered  Repeat urine culture for UTI  Anatomy Scan at 20 weeks with [facility]RI

## 2024-08-14 LAB
AFP MOM: 1.37
AFP VALUE: 42.5 NG/ML
GA ON COLL DATE: 16.3 WEEKS
INSULIN DEP AFP: NO
MAT AGE AT EDD: 31.5 YR
MULTIPLE GEST AFP: NO
OSBR RISK 1 IN AFP: 3920
WEIGHT AFP: 182 LBS

## 2024-08-15 ENCOUNTER — PATIENT MESSAGE (OUTPATIENT)
Dept: OBGYN CLINIC | Facility: CLINIC | Age: 29
End: 2024-08-15

## 2024-08-15 NOTE — PROGRESS NOTES
Brief Postoperative Note      Patient: Love Barakat  YOB: 1985  MRN: [id number]    Date of Procedure: 6/20/2024    Pre-Op Diagnosis Codes:     * Submandibular/cervical lymphadenopathy [R59.0]    Post-Op Diagnosis: Same       Procedure(s):  EXCISIONAL  BIOPSY OF LEFT SUBMANDIBULAR LYMPH NODE    Surgeon(s):  Karishma Hardwick MD    Assistant:  Surgical Assistant: Kaye Muñoz    Anesthesia: General    Estimated Blood Loss (mL): Minimal    Complications: None    Specimens:   ID Type Source Tests Collected by Time Destination   A : Left Submandibular Lymph Node Tissue Lymph Node SURGICAL PATHOLOGY Karishma Hardwick MD 6/20/2024 3085        Implants:  * No implants in log *      Drains: * No LDAs found *    Findings:  Infection Present At Time Of Surgery (PATOS) (choose all levels that have infection present):  No infection present  Other Findings: see op note  This procedure was not performed to treat primary cutaneous melanoma through wide local excision    Electronically signed by KARISHMA HARDWICK MD on 6/20/2024 at 5:15 PM    Op note dictated #[id number]  RP   Neg MS AFP

## 2024-08-15 NOTE — TELEPHONE ENCOUNTER
From: Savanah Jansen  To: Nga Barrow  Sent: 8/15/2024 2:19 PM CDT  Subject: Recent Labwork    Hi, does the lab work I had done on Monday (I think it was AFP test) tell the sex of the baby?     Or is that only in the NIPT test.    Thank you

## 2024-08-19 ENCOUNTER — TELEPHONE (OUTPATIENT)
Dept: OBGYN CLINIC | Facility: CLINIC | Age: 29
End: 2024-08-19

## 2024-08-19 DIAGNOSIS — Z36.8A ENCOUNTER FOR ANTENATAL SCREENING FOR OTHER GENETIC DEFECT (HCC): ICD-10-CM

## 2024-08-19 DIAGNOSIS — Z34.01 ENCOUNTER FOR SUPERVISION OF NORMAL FIRST PREGNANCY IN FIRST TRIMESTER (HCC): ICD-10-CM

## 2024-08-19 NOTE — TELEPHONE ENCOUNTER
Normal Carrier screen and NIPT with [facility]. Baby is RH positive. Patient is aware.    Results sent to be scanned into her EMR

## 2024-09-12 ENCOUNTER — ULTRASOUND ENCOUNTER (OUTPATIENT)
Dept: OBGYN CLINIC | Facility: CLINIC | Age: 29
End: 2024-09-12
Payer: COMMERCIAL

## 2024-09-12 DIAGNOSIS — Z34.00 SUPERVISION OF NORMAL FIRST PREGNANCY, ANTEPARTUM (HCC): Primary | ICD-10-CM

## 2024-09-12 PROCEDURE — 76805 OB US >/= 14 WKS SNGL FETUS: CPT | Performed by: OBSTETRICS & GYNECOLOGY

## 2024-09-13 ENCOUNTER — ROUTINE PRENATAL (OUTPATIENT)
Dept: OBGYN CLINIC | Facility: CLINIC | Age: 29
End: 2024-09-13
Payer: COMMERCIAL

## 2024-09-13 VITALS
BODY MASS INDEX: 31.22 KG/M2 | DIASTOLIC BLOOD PRESSURE: 72 MMHG | HEIGHT: 65 IN | HEART RATE: 77 BPM | WEIGHT: 187.38 LBS | SYSTOLIC BLOOD PRESSURE: 116 MMHG

## 2024-09-13 DIAGNOSIS — Z36.9 ENCOUNTER FOR ANTENATAL SCREENING OF MOTHER (HCC): ICD-10-CM

## 2024-09-13 DIAGNOSIS — Z34.90 PRENATAL CARE, ANTEPARTUM (HCC): Primary | ICD-10-CM

## 2024-09-13 PROCEDURE — 3008F BODY MASS INDEX DOCD: CPT | Performed by: OBSTETRICS & GYNECOLOGY

## 2024-09-13 PROCEDURE — 3074F SYST BP LT 130 MM HG: CPT | Performed by: OBSTETRICS & GYNECOLOGY

## 2024-09-13 PROCEDURE — 3078F DIAST BP <80 MM HG: CPT | Performed by: OBSTETRICS & GYNECOLOGY

## 2024-09-13 NOTE — PROGRESS NOTES
Chief Complaint   Patient presents with    Prenatal Care     CH[facility] ROOM:7     Routine prenatal visit. Patient without complaints. Good fetal movement.  Patient denies any bleeding, leaking fluid, cramping, or contractions.    Assessment/Plan:  20w6d doing well  Ab screen, 1 hr GTT and CBC next.  Blood type O neg  Screening for fetal malformations- Structure survey done yesterday, normal.  Patient had NIPT, AFP and carrier.  Declines level 2    Diagnoses and all orders for this visit:    Prenatal care, antepartum ([facility])    Encounter for  screening of mother ([facility])  -     CBC (with DIFF, Platelet) Reflex to Ferritin; Future  -     Glucose 1 HR OB; Future  -     ANTIBODY SCREEN; Future      Return in about 4 weeks (around 10/11/2024) for Routine Prenatal Visit, Glucola and labs.

## 2024-10-05 ENCOUNTER — PATIENT MESSAGE (OUTPATIENT)
Dept: OBGYN CLINIC | Facility: CLINIC | Age: 29
End: 2024-10-05

## 2024-10-05 ENCOUNTER — LAB ENCOUNTER (OUTPATIENT)
Dept: LAB | Facility: REFERENCE LAB | Age: 29
End: 2024-10-05
Attending: OBSTETRICS & GYNECOLOGY
Payer: COMMERCIAL

## 2024-10-05 DIAGNOSIS — Z36.9 ENCOUNTER FOR ANTENATAL SCREENING OF MOTHER (HCC): ICD-10-CM

## 2024-10-05 LAB
ANTIBODY SCREEN: NEGATIVE
BASOPHILS # BLD AUTO: 0.03 X10(3) UL (ref 0–0.2)
BASOPHILS NFR BLD AUTO: 0.3 %
DEPRECATED RDW RBC AUTO: 44 FL (ref 35.1–46.3)
EOSINOPHIL # BLD AUTO: 0.06 X10(3) UL (ref 0–0.7)
EOSINOPHIL NFR BLD AUTO: 0.6 %
ERYTHROCYTE [DISTWIDTH] IN BLOOD BY AUTOMATED COUNT: 12.7 % (ref 11–15)
GLUCOSE 1H P GLC SERPL-MCNC: 223 MG/DL
HCT VFR BLD AUTO: 38.2 %
HGB BLD-MCNC: 13 G/DL
IMM GRANULOCYTES # BLD AUTO: 0.06 X10(3) UL (ref 0–1)
IMM GRANULOCYTES NFR BLD: 0.6 %
LYMPHOCYTES # BLD AUTO: 1.64 X10(3) UL (ref 1–4)
LYMPHOCYTES NFR BLD AUTO: 16.1 %
MCH RBC QN AUTO: 32.4 PG (ref 26–34)
MCHC RBC AUTO-ENTMCNC: 34 G/DL (ref 31–37)
MCV RBC AUTO: 95.3 FL
MONOCYTES # BLD AUTO: 0.43 X10(3) UL (ref 0.1–1)
MONOCYTES NFR BLD AUTO: 4.2 %
NEUTROPHILS # BLD AUTO: 7.99 X10 (3) UL (ref 1.5–7.7)
NEUTROPHILS # BLD AUTO: 7.99 X10(3) UL (ref 1.5–7.7)
NEUTROPHILS NFR BLD AUTO: 78.2 %
PLATELET # BLD AUTO: 241 10(3)UL (ref 150–450)
RBC # BLD AUTO: 4.01 X10(6)UL
WBC # BLD AUTO: 10.2 X10(3) UL (ref 4–11)

## 2024-10-05 PROCEDURE — 36415 COLL VENOUS BLD VENIPUNCTURE: CPT

## 2024-10-05 PROCEDURE — 85025 COMPLETE CBC W/AUTO DIFF WBC: CPT

## 2024-10-05 PROCEDURE — 86850 RBC ANTIBODY SCREEN: CPT

## 2024-10-05 PROCEDURE — 82950 GLUCOSE TEST: CPT

## 2024-10-06 PROBLEM — O24.410 DIET CONTROLLED GESTATIONAL DIABETES MELLITUS (GDM) IN SECOND TRIMESTER (HCC): Status: ACTIVE | Noted: 2024-10-06

## 2024-10-07 ENCOUNTER — ROUTINE PRENATAL (OUTPATIENT)
Dept: OBGYN CLINIC | Facility: CLINIC | Age: 29
End: 2024-10-07
Payer: COMMERCIAL

## 2024-10-07 ENCOUNTER — PATIENT MESSAGE (OUTPATIENT)
Dept: OBGYN CLINIC | Facility: CLINIC | Age: 29
End: 2024-10-07

## 2024-10-07 ENCOUNTER — DIABETIC EDUCATION (OUTPATIENT)
Age: 29
End: 2024-10-07
Payer: COMMERCIAL

## 2024-10-07 VITALS
SYSTOLIC BLOOD PRESSURE: 112 MMHG | WEIGHT: 192 LBS | BODY MASS INDEX: 31.99 KG/M2 | DIASTOLIC BLOOD PRESSURE: 68 MMHG | HEIGHT: 65 IN

## 2024-10-07 DIAGNOSIS — N39.0 URINARY TRACT INFECTION WITHOUT HEMATURIA, SITE UNSPECIFIED: ICD-10-CM

## 2024-10-07 DIAGNOSIS — Z67.91 RH NEGATIVE STATE IN ANTEPARTUM PERIOD (HCC): ICD-10-CM

## 2024-10-07 DIAGNOSIS — O24.410 DIET CONTROLLED GESTATIONAL DIABETES MELLITUS (GDM) IN SECOND TRIMESTER (HCC): Primary | ICD-10-CM

## 2024-10-07 DIAGNOSIS — O23.41: ICD-10-CM

## 2024-10-07 DIAGNOSIS — O26.899 RH NEGATIVE STATE IN ANTEPARTUM PERIOD (HCC): ICD-10-CM

## 2024-10-07 DIAGNOSIS — O24.410 DIET CONTROLLED GESTATIONAL DIABETES MELLITUS (GDM) IN SECOND TRIMESTER (HCC): ICD-10-CM

## 2024-10-07 DIAGNOSIS — E66.3 OVERWEIGHT (BMI 25.0-29.9): ICD-10-CM

## 2024-10-07 DIAGNOSIS — Z34.00 SUPERVISION OF NORMAL FIRST PREGNANCY, ANTEPARTUM (HCC): Primary | ICD-10-CM

## 2024-10-07 LAB
BILIRUB UR QL STRIP.AUTO: NEGATIVE
CLARITY UR REFRACT.AUTO: CLEAR
GLUCOSE UR STRIP.AUTO-MCNC: NORMAL MG/DL
KETONES UR STRIP.AUTO-MCNC: NEGATIVE MG/DL
LEUKOCYTE ESTERASE UR QL STRIP.AUTO: NEGATIVE
NITRITE UR QL STRIP.AUTO: NEGATIVE
PH UR STRIP.AUTO: 6.5 [PH] (ref 5–8)
PROT UR STRIP.AUTO-MCNC: NEGATIVE MG/DL
RBC UR QL AUTO: NEGATIVE
SP GR UR STRIP.AUTO: 1.01 (ref 1–1.03)
UROBILINOGEN UR STRIP.AUTO-MCNC: NORMAL MG/DL

## 2024-10-07 PROCEDURE — 87086 URINE CULTURE/COLONY COUNT: CPT | Performed by: STUDENT IN AN ORGANIZED HEALTH CARE EDUCATION/TRAINING PROGRAM

## 2024-10-07 PROCEDURE — 81003 URINALYSIS AUTO W/O SCOPE: CPT | Performed by: STUDENT IN AN ORGANIZED HEALTH CARE EDUCATION/TRAINING PROGRAM

## 2024-10-07 PROCEDURE — G0108 DIAB MANAGE TRN  PER INDIV: HCPCS

## 2024-10-07 RX ORDER — IBUPROFEN 100 MG/5ML
1 SUSPENSION ORAL ONCE
Qty: 1 KIT | Refills: 0 | Status: SHIPPED | OUTPATIENT
Start: 2024-10-07 | End: 2024-10-07

## 2024-10-07 RX ORDER — LANCETS
EACH MISCELLANEOUS
Qty: 100 EACH | Refills: 3 | Status: SHIPPED | OUTPATIENT
Start: 2024-10-07

## 2024-10-07 NOTE — PATIENT INSTRUCTIONS
Blood Glucose Goals    Start checking your blood sugars 4 times/day:  1.) Fasting (before your first meal of the day)  2.) 2 hours after breakfast  3.) 2 hours after lunch  4.) 2 hours after dinner    Look into Stelo sensors by Dexcom.     Bring your recorded glucose log sheet and food log sheet to your follow up visit in 2 weeks for review.    Ranges:   Fasting: less than 95 mg/dL  2 hours after meal: less than 120 mg/dL    Food Goals    Healthy, well rounded diet with plenty of water.    At least 175 grams of carbohydrates a day.    Avoid going longer than 5 hours between meals/snacks.    Meal Plan:  Spreading out our carbohydrates throughout the day and adding in small amounts of movement after meals can help us deal with the glucose better.     Breakfast: 30 grams of carbohydrates.  AM Snack: 15 grams of carbohydrates.  Lunch: 45 grams of carbohydrates.  PM Snack: 15 grams of carbohydrates.  Dinner: 45 grams of carbohydrates.  Bedtime Snack: 30 grams of carbohydrates.

## 2024-10-07 NOTE — PROGRESS NOTES
Savanah Jansen   1995 was seen for Gestational Diabetes Counseling: Individual    Date: 10/7/2024  Referring Provider: Rigo Cadena     Start time: 3:30 PM End time: 4:30 PM      Assessment: LMP 2024 (Exact Date)   Weight:   Wt Readings from Last 6 Encounters:   24 187 lb 6 oz   24 182 lb   07/15/24 179 lb 4 oz   24 176 lb   24 176 lb 2 oz   23 174 lb 9.6 oz       Ms. Jansen presents today for initial gestational diabetes education. She presents today with her .    Estimated Date of Delivery: 25   Gestation: 24w2d    History:     OB History    Para Term  AB Living   1 0 0 0 0 0   SAB IAB Ectopic Multiple Live Births   0 0 0 0 0        GDM Screen:     GLUCOSE 1HR OB   Date Value Ref Range Status   10/05/2024 223 (H) See comment mg/dL Final     Comment:     If the plasma glucose level measured after 1 hour is >=130, 135 or 140 mg/dl proceed to \"Glucose Tolerance, 100 gm (0 hr, 1 hr, 2hr, 3hr), Gestational (ADA)\" test on a separate day, as clinically indicated.            History of GDM:  No  Family history of Type 2 Diabetes: Mother had GDM x4, sisters both had GDM. Mother has T2DM, a lot of other family.     Diet & Exercise:     Obtained usual diet history: 3 meals per day, snacking too. Office setting for work.      TYPICAL  FOOD  NOTES   Breakfast  Prefers quick: protein shake, egg bites, oatmeal. Latte (decaf) with milk, unsweetened.          Lunch  Depends, leftovers. Going out sometimes, panera salads, wraps, wendys sometimes. Healthier choices. Bowls, brown rice, whole grain pasta.        Dinner  Pizza sometimes, cauliflower crust too. Chicken. No seafood. Ground beef, taco bowl.        Snacks  Veggie tray, greek yogurt with ranch. String cheese low fat. GoMacro mini bar. Parfait with greek yogurt fruit and granola. Rice cake with PB banana and honey. Special K with almond milk.        Beverages  Unsweetened almond milk, regular oatmilk,  water, decaf coffee, sparkling water, diet soda, rare diet juice.          Eating out  Occasionally              Current physical activity: light, prenatal yoga. Sitting a lot for work, now has stand up desk. No restrictions.       Education:     GDM Overview:  Reviewed gestational diabetes as diagnosis including target blood glucose values.  Benefits, risks, and management options for improving/maintaining glucose control to mother/baby discussed.    Healthy Eating:  Discussed nutrition concepts for pregnancy/healthy eating and effects of food on BG value.  Timing of meals; what is a carbohydrate, protein, fat.  Taught: Carb counting, label reading, meal planning.  Suggested minimal carb intake of 175 gm.    Being Active:  Benefits and effects of activity on BG discussed.  Reviewed types of recommended activity, duration, precautions, and when to call MD.    Monitoring:  Instructed on how to use glucose monitor/proper lancet disposal. Checking schedules are:   Fastin-95 mg/dL, Call MD is >105 md/dL twice in 1 week   2 Hour Post Prandial:  Less than 120 md/dL, Call MD if >140 md/dL twice in 1 week.    Pt came in with a a glucose meter:  No  Instructed /demonstrated ability to perform blood glucose checking on: One Touch Verio  BG 99 mg/dL less than 2 hours since last intake.    Taking Medication:  Reviewed when medication might be indicated.    Reducing Risk:  Effects of elevated blood glucose on mother/baby reviewed.  Discussed management (hyperglycemia, hypoglycemia, sick day, other) and when to call provider.  Post pregnancy management/prevention of Type 2 DM, and increased risk of having diabetes later in life reviewed.    Healthy Coping:  Family involvement/social support encouraged.  Identification of lifestyle behaviors willing to change discussed.    Training Tools Provided:   handout.  BG Log Sheets    Recommendations:      1. Follow recommended meal plan.   2. Begin checking fasting glucose  and 2 hour after meals   3. Bring glucose / food log to next visit with diabetes educator. Bring glucose log sheets to MD office visits.   4. Encouraged activity if no restrictions.   5. Encouraged Savanah to contact the diabetes center with any questions or concerns.    Glucose meter kit, test strips and lancets sent to preferred pharmacy.    Patient verbalized understanding and has no further questions at this time.  Emailed/written materials provided for all topics covered.    Scheduled pt to follow-up x 1 with the Diabetes Center 10/22/2024     Radha NICKERSON-SHAWNEE, [facility] [place]

## 2024-10-07 NOTE — TELEPHONE ENCOUNTER
From: Savanah Jansen  To: Rigo Cadena  Sent: 10/7/2024 11:56 AM CDT  Subject: Diabetes Center Referral    Hi Dr. Cadena     Since I have gestational diabetes and need to go to diabetes center can you provide a referral please    I have an HMO    Thank you

## 2024-10-07 NOTE — PROGRESS NOTES
CHERRI    GA: 24w2d  Vitals:    10/07/24 1439   BP: 112/68   Weight: 192 lb (87.1 kg)   Height: 65\"       Doing well.  Denies LOF/VB/uctx. +FM.   RH negative, will need rhogam at 28 weeks  S/P Anatomy scan wnl and unremarkable  S/p 1 hr glucose: elevated (233); will see diabetic educator 10/07  Reports UTI symptoms of frequency and urgency      Assessment:     Savanah Jansen is a 29 year old  at 24w2d who presents for routine OB visit. Overall doing well.     Problem List Items Addressed This Visit       Overweight (BMI 25.0-29.9)    Supervision of normal first pregnancy, antepartum ([facility]) - Primary    UTI (urinary tract infection) during pregnancy, unspecified ([facility])    Rh negative state in antepartum period ([facility])    Diet controlled gestational diabetes mellitus (GDM) in second trimester ([facility])     Other Visit Diagnoses       Urinary tract infection without hematuria, site unspecified        Relevant Orders    Urine Culture, Routine    Urinalysis, Routine                Plan:     Patient Active Problem List    Diagnosis    Diet controlled gestational diabetes mellitus (GDM) in second trimester ([facility])     1 hr - skip 3 hr GTT  [  ] Referral diabetes educator, appt 10/07      Rh negative state in antepartum period ([facility])     [ ] rhogam  Baby is RH positive on NIPT      UTI (urinary tract infection) during pregnancy, unspecified ([facility])     [x] repeat urine cx was negative 2024 (first trimester) --> yeast infection after abx treatment  [ ] f/u UA and Ucx for UTI symptoms, ordered 10/07      Overweight (BMI 25.0-29.9)    Supervision of normal first pregnancy, antepartum ([facility])       - continue routine prenatal care   - labor and rupture of membrane precautions provided  -Fetal movement instructions given    Return to clinic in  4 weeks for [facility] visit           Note to patient and family   The  Cures Act makes medical notes available to patients in the interest of transparency.  However,  please be advised that this is a medical document.  It is intended as nekt-hc-qmco communication.  It is written and medical language may contain abbreviations or verbiage that are technical and unfamiliar.  It may appear blunt or direct.  Medical documents are intended to carry relevant information, facts as evident, and the clinical opinion of the practitioner.      Ynes Ramirez DO  [facility] - OBGYN

## 2024-10-07 NOTE — TELEPHONE ENCOUNTER
Patient requesting testing for UTI at Return OB appointment this afternoon. Noted in appointment note.

## 2024-10-22 ENCOUNTER — DIABETIC EDUCATION (OUTPATIENT)
Age: 29
End: 2024-10-22
Payer: COMMERCIAL

## 2024-10-22 DIAGNOSIS — O24.410 DIET CONTROLLED GESTATIONAL DIABETES MELLITUS (GDM) IN SECOND TRIMESTER (HCC): Primary | ICD-10-CM

## 2024-10-22 NOTE — PATIENT INSTRUCTIONS
Blood Glucose Goals     Continue checking your blood sugars 4 times/day:  1.) Fasting (before your first meal of the day)  2.) 2 hours after breakfast  3.) 2 hours after lunch  4.) 2 hours after dinner     Ranges:   Fasting: less than 95 mg/dL  2 hours after meal: less than 120 mg/dL     Let your OB know if:  - your fasting is more than 95 mg/dL 2x in a week  - your 2 hours after a meal is more than 120 mg/dL 2x in a week     Food Goals     Healthy, well rounded diet with plenty of water.     At least 175 grams of carbohydrates a day.     If 3 full meals is too much for you, aim for small frequent snacks.      Avoid going longer than 5 hours between meals/snacks.      Aim for 4x servings of calcium per day. This will help with your baby's development. Foods with calcium include:  - Dairy products (milk, yogurt, cheese)  - Oranges and calcium added orange juice  - Kale, sofy greens, broccoli  - Dried figs, papayas, and bananas  - Seeds: Himanshu, sesame  - Beans and lentils  - Almonds  - Rhubarb  - Fortified foods and drinks (cereals, non-dairy milks)  - Tofu  - Edamame

## 2024-10-22 NOTE — PROGRESS NOTES
Savanah Jansen  : 1995 was seen for GDM  Individual Follow-Up Counseling    Date: 10/22/2024  Referring Provider: Rigo Cadena  Start time: 2:30 PM End time: 2:55 PM      Assessment: LMP 2024 (Exact Date)   Weight:   Wt Readings from Last 6 Encounters:   10/07/24 192 lb   24 187 lb 6 oz   24 182 lb   07/15/24 179 lb 4 oz   24 176 lb   24 176 lb 2 oz       Ms. Jansen presents today for follow up gestational diabetes education. She is accompanied by her . She reports she has been doing well.    Estimated Date of Delivery: 25   Gestation:26w3d    Blood Glucose Levels:      Lowest Highest Average Amount out of target   Fasting    73 mg/dL  93 mg/dL  82 mg/dL  0 readings/12 total   2hr post Breakfast    71 mg/dL  124 mg/dL  89 mg/dL  1 readings/13 total   2hr post Lunch    76 mg/dL  135 mg/dL  101 mg/dL  2 readings/14 total   2hr post Dinner    78 mg/dL  120 mg/dL  100 mg/dL  0 readings/13 total       -----------------------------  Dexcom Clarity  -----------------------------  Savanah Jansen  YOB: 1995  Generated at: Tue, Oct 22, 2024 3:02 PM CDT  Reporting period: Wed Oct 9, 2024 - Tue Oct 22, 2024  -----------------------------  Glucose Details  Average glucose: 94 mg/dL  Standard deviation: 20 mg/dL  GMI: 5.6%  -----------------------------  Time in Range  Very High: 0%  High: 4%  In Range: 96%  Low: 0%  Very Low: <1%  Target Range   mg/dL    -----------------------------  CGM Details  Sensor usage: 93%  Days with CGM data:       She brought in BG log and food log. Reviewed in detail with patient. Copies made and sent to scan. CGM reports generated and sent to scan.     Diet & Exercise:     Obtained diet history from last 2 weeks:     TYPICAL  FOOD  NOTES   Breakfast  Protein shake, toast with eggs, avocado   Protein shake with macro bar.   Egg, toast, avocado, cheese, coffee.        8/8:30 AM   Lunch  Taco salad with tostitos and  mini quesadilla.    Greek chicken bowl.   Hello fresh chicken sandwich.     12/12:30 PM   Dinner  Chicken sausage with sweet potatoes, brown rice, bell pepper, avocado.    Hello fresh chicken with asparagus and toast.       6:30/7 PM    Snacks  Multigrain crackers, cheese sticks. Latte. Egg wrap. Apple.            Following meal plan: Yes  Skips: NA  Meals are balanced Yes.  Carb Intake is Adequate.  Food Selections are Very Healthy.  Drinking plenty of water Yes.    Education:     GDM Overview:  Reviewed target blood glucose values for GDM.  Discussed benefits/risks to mother/baby management options to improve/maintain glucose control.     Healthy Eating:  Reviewed/Reinforced:  Nutrition concepts for pregnancy/healthy eating and effect of food on blood glucose.  Meal planning process and benefits of pre-planning meals/snacks.  Appropriate timing of meals/snacks.  Carb counting.  4 servings of calcium while pregnant/breastfeeding.    Being Active:  Reviewed benefits of effects of activity on BG values.  Reviewed types of activity, duration, precautions.    Monitoring:  Instructed to report readings to MD as directed.  Call MD: if fasting blood glucose is > 95 twice in 1 week. If 2 hr postprandial is > 120 twice in 1 week at any one meal.    Taking Medication:  Reviewed appropriate timing (if on insulin) of meds.   Reviewed probability of needing medication adjustments throughout pregnancy.     Reducing Risk:  Discussed management of (hyperglycemia, hypoglycemia) and when to call provider.    Recommendations:      1. Follow recommended meal plan.   2. Test blood glucose and ketones as directed.    3. Bring glucose log to each MD visit.   4. Start/continue activity if no restrictions.    5. Additional recommendations: continue with CGM and current diet as tolerated.     Patient verbalized understanding and has no further questions at this time.      Radha WALTERN-RN, [facility]

## 2024-11-04 ENCOUNTER — ROUTINE PRENATAL (OUTPATIENT)
Dept: OBGYN CLINIC | Facility: CLINIC | Age: 29
End: 2024-11-04
Payer: COMMERCIAL

## 2024-11-04 VITALS
WEIGHT: 188.81 LBS | SYSTOLIC BLOOD PRESSURE: 118 MMHG | BODY MASS INDEX: 31 KG/M2 | DIASTOLIC BLOOD PRESSURE: 72 MMHG | HEART RATE: 84 BPM

## 2024-11-04 DIAGNOSIS — L29.9 PRURITUS: ICD-10-CM

## 2024-11-04 DIAGNOSIS — O26.899 RH NEGATIVE STATE IN ANTEPARTUM PERIOD (HCC): ICD-10-CM

## 2024-11-04 DIAGNOSIS — Z23 NEED FOR VACCINATION: Primary | ICD-10-CM

## 2024-11-04 DIAGNOSIS — Z67.91 RH NEGATIVE STATE IN ANTEPARTUM PERIOD (HCC): ICD-10-CM

## 2024-11-04 NOTE — PROGRESS NOTES
CHERRI  Doing well, some round ligament pain and pain in groin and pubic area with position changes an when she lifts her legs. She also notes   GOOD FM  Denies VB/LOF/uctx  Rh negative- Rhogam given, TDAP received  Last blood sugars reviewed. Discussed bringing in a log.  Itching on stretch marks. PIH labs and bile acids ordered.  RTC in 2 wks  Fetal movement discussed

## 2024-11-10 ENCOUNTER — LAB ENCOUNTER (OUTPATIENT)
Dept: LAB | Facility: REFERENCE LAB | Age: 29
End: 2024-11-10
Payer: COMMERCIAL

## 2024-11-10 DIAGNOSIS — L29.9 PRURITUS: ICD-10-CM

## 2024-11-10 LAB
ALBUMIN SERPL-MCNC: 3.9 G/DL (ref 3.2–4.8)
ALBUMIN/GLOB SERPL: 1.3 {RATIO} (ref 1–2)
ALP LIVER SERPL-CCNC: 74 U/L
ALT SERPL-CCNC: 23 U/L
ANION GAP SERPL CALC-SCNC: 7 MMOL/L (ref 0–18)
AST SERPL-CCNC: 18 U/L (ref ?–34)
BASOPHILS # BLD AUTO: 0.03 X10(3) UL (ref 0–0.2)
BASOPHILS NFR BLD AUTO: 0.3 %
BILIRUB SERPL-MCNC: 0.6 MG/DL (ref 0.3–1.2)
BUN BLD-MCNC: 9 MG/DL (ref 9–23)
BUN/CREAT SERPL: 15.5 (ref 10–20)
CALCIUM BLD-MCNC: 9.5 MG/DL (ref 8.7–10.4)
CHLORIDE SERPL-SCNC: 107 MMOL/L (ref 98–112)
CO2 SERPL-SCNC: 26 MMOL/L (ref 21–32)
CREAT BLD-MCNC: 0.58 MG/DL
DEPRECATED RDW RBC AUTO: 47.9 FL (ref 35.1–46.3)
EGFRCR SERPLBLD CKD-EPI 2021: 124 ML/MIN/1.73M2 (ref 60–?)
EOSINOPHIL # BLD AUTO: 0.08 X10(3) UL (ref 0–0.7)
EOSINOPHIL NFR BLD AUTO: 0.9 %
ERYTHROCYTE [DISTWIDTH] IN BLOOD BY AUTOMATED COUNT: 13.2 % (ref 11–15)
FASTING STATUS PATIENT QL REPORTED: NO
GLOBULIN PLAS-MCNC: 2.9 G/DL (ref 2–3.5)
GLUCOSE BLD-MCNC: 93 MG/DL (ref 70–99)
HCT VFR BLD AUTO: 39.2 %
HGB BLD-MCNC: 12.8 G/DL
IMM GRANULOCYTES # BLD AUTO: 0.04 X10(3) UL (ref 0–1)
IMM GRANULOCYTES NFR BLD: 0.5 %
LYMPHOCYTES # BLD AUTO: 1.92 X10(3) UL (ref 1–4)
LYMPHOCYTES NFR BLD AUTO: 21.7 %
MCH RBC QN AUTO: 32.2 PG (ref 26–34)
MCHC RBC AUTO-ENTMCNC: 32.7 G/DL (ref 31–37)
MCV RBC AUTO: 98.5 FL
MONOCYTES # BLD AUTO: 0.54 X10(3) UL (ref 0.1–1)
MONOCYTES NFR BLD AUTO: 6.1 %
NEUTROPHILS # BLD AUTO: 6.22 X10 (3) UL (ref 1.5–7.7)
NEUTROPHILS # BLD AUTO: 6.22 X10(3) UL (ref 1.5–7.7)
NEUTROPHILS NFR BLD AUTO: 70.5 %
OSMOLALITY SERPL CALC.SUM OF ELEC: 288 MOSM/KG (ref 275–295)
PLATELET # BLD AUTO: 231 10(3)UL (ref 150–450)
POTASSIUM SERPL-SCNC: 4.5 MMOL/L (ref 3.5–5.1)
PROT SERPL-MCNC: 6.8 G/DL (ref 5.7–8.2)
RBC # BLD AUTO: 3.98 X10(6)UL
SODIUM SERPL-SCNC: 140 MMOL/L (ref 136–145)
T PALLIDUM AB SER QL IA: NONREACTIVE
WBC # BLD AUTO: 8.8 X10(3) UL (ref 4–11)

## 2024-11-10 PROCEDURE — 83789 MASS SPECTROMETRY QUAL/QUAN: CPT

## 2024-11-10 PROCEDURE — 85025 COMPLETE CBC W/AUTO DIFF WBC: CPT

## 2024-11-10 PROCEDURE — 80053 COMPREHEN METABOLIC PANEL: CPT

## 2024-11-10 PROCEDURE — 36415 COLL VENOUS BLD VENIPUNCTURE: CPT

## 2024-11-10 PROCEDURE — 86780 TREPONEMA PALLIDUM: CPT

## 2024-11-10 PROCEDURE — 87389 HIV-1 AG W/HIV-1&-2 AB AG IA: CPT

## 2024-11-18 ENCOUNTER — ROUTINE PRENATAL (OUTPATIENT)
Dept: OBGYN CLINIC | Facility: CLINIC | Age: 29
End: 2024-11-18
Payer: COMMERCIAL

## 2024-11-18 VITALS
BODY MASS INDEX: 31.76 KG/M2 | SYSTOLIC BLOOD PRESSURE: 100 MMHG | HEIGHT: 65 IN | HEART RATE: 84 BPM | WEIGHT: 190.63 LBS | DIASTOLIC BLOOD PRESSURE: 64 MMHG

## 2024-11-18 DIAGNOSIS — Z34.93 THIRD TRIMESTER PREGNANCY (HCC): Primary | ICD-10-CM

## 2024-11-18 DIAGNOSIS — O24.410 GDM (GESTATIONAL DIABETES MELLITUS), CLASS A1 (HCC): ICD-10-CM

## 2024-11-18 PROCEDURE — 3008F BODY MASS INDEX DOCD: CPT | Performed by: OBSTETRICS & GYNECOLOGY

## 2024-11-18 PROCEDURE — 3074F SYST BP LT 130 MM HG: CPT | Performed by: OBSTETRICS & GYNECOLOGY

## 2024-11-18 PROCEDURE — 3078F DIAST BP <80 MM HG: CPT | Performed by: OBSTETRICS & GYNECOLOGY

## 2024-11-18 NOTE — PATIENT INSTRUCTIONS
Labor Instructions    How do I know if it’s true labor?  One of the most important aspects of any pregnancy is being able to recognize the onset of labor.  Unfortunately, on occasion it can be difficult or confusing, especially if you have had one or more children.  Each labor can begin in a different way even if you have had four or five children.    If this is your first child, it is very common to have labor for an average greater than 10 hours; however, there have been rare instances for labor to be two hours or less for a first time mother. It is more important for you to know if this is your second or third baby to realize that any labor after the first is usually shorter.  There is no way to tell how long or short the labor will be. Therefore, please call us if you are unsure labor has started.       Usually, during the last six weeks of pregnancy, Angel-Edouard contractions or “false labor pains occur”.  False labor is generally not very painful though it is not always easy to tell.  You may feel contractions, cramps or uterine tightening somewhere between every 3-30 minutes but they will not continue to get stronger over time.  If you lie down, drink plenty of fluid or walk around, the contractions may go away.   False contractions are very common if you have been active on your feet for several hours.   Women frequently worry about being sent home from the hospital without having their baby (i.e. the labor stopped).  Actually, this is an unnecessary worry, for this is an infrequent occurrence.     True labor usually begins in one of two ways.  In most patients it begins with contractions of the uterus, which are irregular (but not always) in the beginning.  They are cramp-like in character and feel similar to menstrual cramps.  After a while, they become more regular, and they seem to last a little longer, and feel a little sharper.  These symptoms are very important-more important- than the timing of the  contractions.  Having regular (usually closer), longer lasting (35-70 seconds), and sharper (more painful) contractions are the common symptoms of actual labor.  The second way in which labor can begin which occurs in approximately 30% of all patients is the rupture of the bag of water.  This is a sudden gush of watery fluid, usually sufficient to run down your leg and onto the floor, or you may wet a large area of the bed if it happens at night.  There may also be tricking that is uncontrolled.  If you are unsure, please call the office.      When should I call?  When contractions are strong and every 3-5 minutes.  If you have a gush of water or you think you might be leaking fluid.  If you are bleeding heavily.  If your baby is not moving around at least every 1-2 hours.  If you are worried about something.  When you think you are ready to go to the hospital.    Who do I call?  Call the office at [phone].  If the office is closed, the answering service will send a message to the physician on call.  The on call physician will be available for emergency phone calls only.          Can I eat in labor?  It is good to eat a light meal at home before going to the hospital.  Eat foods like crackers, popsicles, soup and fruit.  Avoid foods that are difficult to digest like meat, a lot of dairy products and high fat foods.  DO NOT EAT if you know you are scheduled for a  ().      What will happen when I get to the hospital?  When you arrive at the hospital, you will be admitted and examined.   There are a few factors that will determine if you will be allowed to be up out of bed or if you would need to stay in bed.  The main factors are how well the baby is entering into the pelvis and if the bag of durand is in intact or ruptured.  An intravenous (IV) solution will, with exceptions, be started.  This is extremely important especially for the baby.   Your  will be allowed in the room during your  labor. During the delivery, the nurses will inform you of the hospital policy and how many coaches are allowed.  You may desire pain medication or anesthesia for pain.  You probably discussed some aspects of pain medication with us during your prenatal care.  The various options may also have been discussed in Prenatal Classes.  We utilize IV narcotics and epidural anesthesia when our patients request to have them.  If you chose to have no anesthesia, none will be administered.  A local anesthetic may be used at the time of delivery      What should I to bring to the hospital?  Maternity clothes to go home in  You can bring your own night gown to wear after giving birth, but most women prefer to wear the hospital gown because it may get soiled  Nursing Bra if you are planning to breastfeed  Clothes for your baby to go home in  Baby Car Seat.  Be sure you know how to install it correctly. Please install it before going to the hospital  Routine toiletries like toothbrush, shampoo, hairbrush and etc.   You can bring your favorite pillow, but please put a colored pillow case on it so it doesn’t get mixed up with hospital pillows    How long will I stay in the hospital?  The date you leave the hospital may vary depending on the speed of your recovery.  If you have a vaginal delivery, you will stay in the hospital 24-48 hours after your delivery as long as you aren’t having any complications.    If you have had a , you will stay in the hospital 48-72 hours as long as you aren’t having any complications.       Going Home Instructions  There are no set rules as to what you may do each day or week after you are home.  You will receive discharge instructions to help you each day.  Remember, early ambulation in the hospital is to prevent complications.  Do not let this lull you into a false sense of strength or ability to do certain physical acts which may tire you excessively.  Please call the office within a few days  after you are discharged from the hospital to schedule your post-partum visit, which is usually 4-6 weeks after delivery.    Any medications necessary will be discussed on an individual basis.  If you decide to breastfeed your baby, you should continue your prenatal vitamins.  If you do not breastfeed, simply finish the prenatal vitamins you have.      The staff at [facility] wish you a joyous and exciting birth.  If there is anything we can do to make this a better experience for you please do not hesitate to ask.           VACCINE RECOMMENDATIONS     Pertussis- [place] has significant outbreaks of pertussis (whooping cough) every year.  Therefore, the CDC recommends that all pregnant women receive a Tdap vaccine during pregnancy, regardless of whether you have received one previously.  The vaccine reduces your chances of shelley the illness, and also provides some natural immunity to your baby for possible exposures after birth.  The best time to get the vaccine is between 27 and 36 weeks.  Baby caregivers (grandparents, spouse) should also make sure they are up to date on the vaccine (within the last 10 years).     Influenza- Pregnant women who develop the flu are more prone to serious complications that can affect both mother and baby.  The CDC recommends that all women who will be pregnant during flu season (October to May) receive the flu vaccine (injection only, not nasal spray).  The vaccine can be given during any stage of pregnancy.     Both vaccines are offered in our office, as well as through many pharmacies and primary care offices.    Covid-19 Vaccine   If you are pregnant or were recently pregnant, you are more likely to get very sick from COVID-19 than people who are not pregnant. Additionally, if you have COVID-19 during pregnancy, you are more likely to have complications that can affect your pregnancy and developing baby.  Please check the CDC website for the most  up-to-date recommendations on Covid vaccination at www.cdc.gov/coronavirus/vaccine    COVID-19 vaccination is recommended for everyone ages 6 months and older, including people who are pregnant, breastfeeding, trying to get pregnant now, or who might become pregnant in the future. Evidence shows that COVID-19 vaccination before and during pregnancy is safe and effective and suggests that the benefits of vaccination outweigh any known or potential risks. New data show that vaccination during pregnancy can help protect babies younger than 6 months old, when they are too young to be vaccinated themselves, from hospitalization due to COVID-19.    RSV Vaccine  RSV (Respiratory Syncytial Virus) is a common respiratory virus that causes cold like symptoms in adults and babies.  Infants and adults over 60 years old are more likely to develop severe RSV infection requiring hospitalization.  A new RSV vaccine was released in October 2023 that if given during the third trimester of pregnancy, reduces your baby's risk of being hospitalized with RSV after birth by up to 80%.  Therefore the CDC recommends that pregnant moms receive one dose of the RSV vaccine Pfizer Abrysvo sometime between 32 and 36 weeks of pregnancy, before or during RSV season (September through January).  If you decide not to get the vaccine, you can talk to your pediatrician about your baby receiving the RSV antibody injection Beyfortus after birth.       Medications Safe in Pregnancy  The following over-the-counter medications may be taken safely after 12 weeks gestation by any patient who is pregnant.  Please follow the instructions on the package for adult dosage.  If you experience any symptoms prior to 12 weeks, please call the office at [phone].      Colds/Congestion: Flonase, Saline Nasal Spray, Neti Pot, Plain Robitussin, Robitussin DM, Mucinex DM, Vicks 44 E, Vicks Vapor rub, Cough drops without Zinc or Sudafed.   Contact your doctor if you  have a persistent fever over 100.4, shortness of breath, coughing up green mucus, or a sore throat that persists from more than 3 days    Diarrhea: Imodium, Maalox Anti-Diarrheal or Kaopectate  Contact the office if you have diarrhea for more than 3 days.    Allergies: Benadryl, Claritin or Zyrtec    Hemorrhoids: Tucks pads, Preparation H, Annusol, Sitz bath or Witch hazel  Eat a high fiber diet and drink plenty of fluids.    Yeast: Monistat 3 or 7  Call if your symptoms do not improve, or if you experience vaginal bleeding, or a watery discharge.    Constipation: Metamucil, Colace, fiber supplement, Milk of Magnesia or Dulcolax  Drink plenty of fluids, eat high-fiber foods and take the above laxatives sporadically.     Headache or Mild aches and pain: Extra Strength Tylenol     Gas: Gas X, Gelusil, Papaya Tablets, Maalox, Mylicon or Mylanta Gas    Heartburn: Tums, Mylanta, Pepcid AC or Maalox    Sore throat: Alcohol free Chloraseptic spray or Lozenges     Nausea and Vomiting: ½ tablet Unisom plus 100mg of Vitamin B6 at bedtime (may increase B6 up to 200mg per day), Helen root tea or raspberry leaf tea    Insomnia: Tylenol PM, Vitamin B6 50mg, warm bath or milk, Unisom, Nytol or Sominex.     We have listed a few medications for common symptoms seen in pregnancy.  Please contact the office if you are unsure about any over the counter medications that are not listed above.      General Instructions for Common Concerns in Pregnancy    The following instructions are recommended by the OB/GYNE Department at [facility]. If symptoms persist for more than 2-3 days, please contact the office for further assistance at [phone].    Symptoms of a cold: sneezing, coughing, headache, runny nose, watery eyes, slightly elevated temperature (under 100). A temperature over 100, with a productive cough (yellow-green mucus), needs evaluation by your primary care provider. There is no cure for the cold/flu... it takes  time!  Rest Chicken soup Increase fluid intake   Cepacol, Sucrets lozenges, or Halls (no Zinc) Robitussin cough syrup, any kind Sudafed       Heartburn or Upset Stomach: *Liquid antacids are frequently more effective than tablets. Call the office if no relief. Avoid spicy food or any food that may cause symptoms.  Maalox or Maalox Plus Pepcid Tums   Mylanta ll, sodium free Zantac 75mg twice daily Tagamet   Milk of Magnesia          Headache or Mild aches and pains associated with colds or flu: Call office if persistent or severe.   Rest Tylenol or Extra Strength Tylenol   (Acetaminophen)                                                           Diarrhea(simple): Call office if persists more than 2 days or if accompanied by a fever.  Contact the office if you have been in a foreign country, if other family members have been ill, or if there is a possibility of contact with contaminated food/water prior to onset.  Minimum residue diet: No milk, fruits or vegetables other than peeled, cooked potatoes. Avoid spicy or greasy foods. You can have liquids, bread, noodles, rice, plain pasta, and tender cooked meat. Fish and poultry may be eaten as desired.  Kaopectate  Immodium A-D Lomotil       Constipation (with no nausea or vomiting): Increase fluids, fiber, and activity. Call with severe straining.  Milk of Magnesia Colace 50 mg 1-2 caps daily Prune Juice   Metamucil/Benefiber         Hemorrhoids: Call if severe bleeding or pain  Preparation H Anusol Suppositories Tucks           OVER THE COUNTER MEDICATION USE IN PREGNANCY    NO ASPIRIN, IBUPROFIN (ADVIL, MOTRIN, OR ALEVE), OR ZINC    The following over the counter (OTC) medications may be safely taken by pregnant women following all the directions on the package for adult usage.    Cold, flu, and allergy relief: Nasal congestion, cough, sneezing, runny nose, minor aches and pains, scratchy/sore throat  Benadryl Class B   Benadryl Cold or Allergy Sinus Class C    Claritin, Claritin D Class B   Chlor-trimeton Allergy Class B   Chlor-trimeton Allergy-sinus Class C   Comtrex Allergy Sinus/Multi Symptom Cough Class C   Contact (Severe cold and flu, Decongestant, Antihistamine, Day and Night Cold/Flu) Class C   Ocean Nasal Spray/Mist Class B   Robitussin CF, DM, PE/Robitussin Cough and Cold/Robitussin Cough, Cold, and Flu Class C   Sinutab Sinus Allergy Class C   Sudafed/Tavist- D Class C   Theraflu Cold and Cough/Flu and Cold Class C   Tylenol Regular and Extra strength Class B   Tylenol Cold, Multi-symptom, Cold Nighttime Class C   Vicks Formula 44, 44D, and 44E Class C     Gas:  Gas X, Mylicon Drops, Mylanta Gas Class C     Motion Sickness:  Dramamine/Dramamine ll Class B     Yeast Symptoms:  Monistat 7 Class B     Insomnia:  Sominex Class B                     Morning sickness prevention and remedies:  Eat a piece of toast or a few crackers before you get out of bed in the morning ( place them by your bed the night before), or when you feel nauseated.   Get out of bed slowly and avoid sudden movements.   Eat smaller, more frequent meals during the day so your stomach does not remain empty for very long.   Eat high protein foods: eggs, cheese, nuts, meats,  ect, as well as fruits/fruit juices. These foods help prevent low levels of sugar in your blood, which can also cause nausea.   Sip spearmint, peppermint, ginger, or raspberry leaf tea. Ginger ale, 7 up , or Sprite   Vitamin B6 25 mg/day to start; if not helpful, increase by 25 mg/day until taking 100 mg/day maximum.   Medication and Mother's Milk, 18th ED, 2019 and Physician's Desk Reference, 71st Ed, 2017

## 2024-11-18 NOTE — PROGRESS NOTES
CHERRI  29 year old yo, , at GA 30w2d    Vitals:    24 1422   BP: 100/64   Pulse: 84   Weight: 190 lb 9.6 oz (86.5 kg)   Height: 65\"       Doing well, +FM  Denies LOF/VB/uctx  Rh negative (baby RH+ on NIPT), TDAP and RhoGam received.     GDMA1 - blood sugar log discussed, values appropriate.     Endorsed constipation - discussed dietary changes, increased fluid intake, and OTC medications safe to take in pregnancy. Discussed leafy green vegetables such as spinach and bok barrera.     Discussed vaccines - plan for flu shot and RSV at next appointment.       Assessment:     Savanah Jansen is a 29 year old  at 30w2d who presents for routine OB visit. Overall doing well.     Problem List Items Addressed This Visit    None  Visit Diagnoses       Third trimester pregnancy ([facility])    -  Primary    GDM (gestational diabetes mellitus), class A1 ([facility])                    Plan:     Patient Active Problem List    Diagnosis    Diet controlled gestational diabetes mellitus (GDM) in second trimester ([facility])     1 hr - skip 3 hr GTT  [X] Referral diabetes educator, appt 10/07      Rh negative state in antepartum period ([facility])     [X] rhogam administered 2024  Baby is RH positive on NIPT      UTI (urinary tract infection) during pregnancy, unspecified ([facility])     [x] repeat urine cx was negative 2024 (first trimester) --> yeast infection after abx treatment  [X] f/u UA and Ucx for UTI symptoms, ordered 10/07      Overweight (BMI 25.0-29.9)    Supervision of normal first pregnancy, antepartum ([facility])       - continue routine prenatal care   - labor and rupture of membrane precautions provided  - Fetal movement instructions given    Return to clinic in 2 weeks for CHERRI visit       Leta Ríos MD   [facility] - OBGYN      Note to patient and family   The  [place] Cures Act makes medical notes available to patients in the interest of transparency.  However, please be advised that this is a medical document.  It  is intended as [id number] communication.  It is written and medical language may contain abbreviations or verbiage that are technical and unfamiliar.  It may appear blunt or direct.  Medical documents are intended to carry relevant information, facts as evident, and the clinical opinion of the practitioner.

## 2024-11-19 LAB
CHENODEOXYCHOLIC ACIDS: 0.6 UMOL/L
CHOLIC ACIDS: 0.7 UMOL/L
DEOXYCHOLIC ACIDS: 1.2 UMOL/L
TOTAL BILE ACIDS: 2.5 UMOL/L
URSODEOXYCHOLIC ACIDS: <0.1 UMOL/L

## 2024-12-03 ENCOUNTER — ULTRASOUND ENCOUNTER (OUTPATIENT)
Dept: OBGYN CLINIC | Facility: CLINIC | Age: 29
End: 2024-12-03
Payer: COMMERCIAL

## 2024-12-03 ENCOUNTER — ROUTINE PRENATAL (OUTPATIENT)
Dept: OBGYN CLINIC | Facility: CLINIC | Age: 29
End: 2024-12-03
Payer: COMMERCIAL

## 2024-12-03 VITALS
HEART RATE: 84 BPM | BODY MASS INDEX: 31.65 KG/M2 | WEIGHT: 190 LBS | HEIGHT: 65 IN | DIASTOLIC BLOOD PRESSURE: 60 MMHG | SYSTOLIC BLOOD PRESSURE: 102 MMHG

## 2024-12-03 DIAGNOSIS — O24.410 DIET CONTROLLED GESTATIONAL DIABETES MELLITUS (GDM) IN SECOND TRIMESTER (HCC): ICD-10-CM

## 2024-12-03 DIAGNOSIS — Z34.00 SUPERVISION OF NORMAL FIRST PREGNANCY, ANTEPARTUM (HCC): Primary | ICD-10-CM

## 2024-12-03 PROCEDURE — 90678 RSV VACC PREF BIVALENT IM: CPT | Performed by: OBSTETRICS & GYNECOLOGY

## 2024-12-03 PROCEDURE — 3074F SYST BP LT 130 MM HG: CPT | Performed by: OBSTETRICS & GYNECOLOGY

## 2024-12-03 PROCEDURE — 3078F DIAST BP <80 MM HG: CPT | Performed by: OBSTETRICS & GYNECOLOGY

## 2024-12-03 PROCEDURE — 90471 IMMUNIZATION ADMIN: CPT | Performed by: OBSTETRICS & GYNECOLOGY

## 2024-12-03 PROCEDURE — 3008F BODY MASS INDEX DOCD: CPT | Performed by: OBSTETRICS & GYNECOLOGY

## 2024-12-03 NOTE — PROGRESS NOTES
[facility]  Obstetrics and Gynecology  Routine OB Visit Progress Note    Subjective:     Savanah Jansen is a 29 year old  at 32w3d who presents for routine OB visit.  Patient reports doing well.  Denies contractions, vaginal bleeding or leakage of fluid.  Good fetal movement.    1 hour GTT, CBC done.  Rh negative; has received rhogam, TDAP received  Wants RSV today  EPDS Depression Scale Total: 0 (10/7/2024  2:40 PM)   HIV and RPR done  Growth scan today reviewed.   Pt states having intermittent epigastric pain; discussed diet changes and trying Tums.     Objective:   /60   Pulse 84   Ht 65\"   Wt 190 lb (86.2 kg)   LMP 2024 (Exact Date)   BMI 31.62 kg/m²   ABD: gravid, nontender  FHT: 158       Assessment:     Savanah Jansen is a 29 year old  at 32w3d who presents for routine OB visit. Overall doing well.     Problem List Items Addressed This Visit          Endocrine and Metabolic    Diet controlled gestational diabetes mellitus (GDM) in second trimester ([facility])    Relevant Orders    US PREG GROWTH, FU OR REPEAT (CPT=76816) (Completed)       Gravid and     Supervision of normal first pregnancy, antepartum ([facility]) - Primary       Plan:     Patient Active Problem List    Diagnosis    Diet controlled gestational diabetes mellitus (GDM) in second trimester ([facility])     1 hr - skip 3 hr GTT  [X] Referral diabetes educator, appt 10/07  - 32 week ultrasound EFW 70%ile 2229g AC 80%ile      Rh negative state in antepartum period ([facility])     [X] rhogam administered 2024  Baby is RH positive on NIPT      UTI (urinary tract infection) during pregnancy, unspecified ([facility])     [x] repeat urine cx was negative 2024 (first trimester) --> yeast infection after abx treatment  [X] f/u UA and Ucx for UTI symptoms, ordered 10/07      Overweight (BMI 25.0-29.9)    Supervision of normal first pregnancy, antepartum ([facility])       - continue routine prenatal care   - labor  and rupture of membrane precautions provided  - Fetal movement instructions given    Return to clinic in 2 weeks for CHERRI visit     Future Appointments   Date Time Provider Department Center   12/17/2024  2:30 PM Leta Ríos MD [facility] [place]   12/30/2024 12:30 PM Jonelle Calvo MD [facility] [place]   1/6/2025  2:00 PM Nga Barrow APN [facility] [place]   1/14/2025  2:30 PM Alyssia Decker MD [facility] [place]   1/20/2025  2:30 PM Ynes Ramirez DO [facility] [place]   1/27/2025  2:30 PM Tonja Muhammad MD [facility] [place]         Jonelle Calvo MD   [facility]    Note to patient and family   The 21st Century Cures Act makes medical notes available to patients in the interest of transparency.  However, please be advised that this is a medical document.  It is intended as [id number] communication.  It is written and medical language may contain abbreviations or verbiage that are technical and unfamiliar.  It may appear blunt or direct.  Medical documents are intended to carry relevant information, facts as evident, and the clinical opinion of the practitioner.

## 2024-12-17 ENCOUNTER — ROUTINE PRENATAL (OUTPATIENT)
Dept: OBGYN CLINIC | Facility: CLINIC | Age: 29
End: 2024-12-17
Payer: COMMERCIAL

## 2024-12-17 VITALS
HEART RATE: 94 BPM | DIASTOLIC BLOOD PRESSURE: 72 MMHG | BODY MASS INDEX: 32.22 KG/M2 | HEIGHT: 65 IN | WEIGHT: 193.38 LBS | SYSTOLIC BLOOD PRESSURE: 108 MMHG

## 2024-12-17 PROBLEM — Z34.00 SUPERVISION OF NORMAL FIRST PREGNANCY, ANTEPARTUM (HCC): Status: RESOLVED | Noted: 2024-07-15 | Resolved: 2024-12-17

## 2024-12-17 PROCEDURE — 3078F DIAST BP <80 MM HG: CPT | Performed by: OBSTETRICS & GYNECOLOGY

## 2024-12-17 PROCEDURE — 3074F SYST BP LT 130 MM HG: CPT | Performed by: OBSTETRICS & GYNECOLOGY

## 2024-12-17 PROCEDURE — 3008F BODY MASS INDEX DOCD: CPT | Performed by: OBSTETRICS & GYNECOLOGY

## 2024-12-17 NOTE — PATIENT INSTRUCTIONS
KICK COUNT INSTRUCTIONS    After 28 weeks of pregnancy, we would like for you to monitor your baby’s movement daily by doing kick counts, an easy way for you to assess your baby’s well-being.    The two main ways to track fetal kicks are:  Count the number of kicks you feel in a one-hour period.  Measure the amount of time it takes for the fetus to kick 10 times.    How to count kicks:  - Choose a time when the baby is active, such as after a meal.  - Preferably, we recommend monitoring fetal movements around the same time each day.   - You can use a free geri such as 'Count the Kicks' - https://countthekicks.org/   - Sit comfortably or lie on your side, and count the number of movements in an hour.   - The evening hours seem to be the most convenient time to do this test, although you can also do this test anytime you are concerned about the baby’s movement.    The American Congress of Obstetricians and Gynecologists (ACOG) recommends that you time how long it takes you to feel 10 kicks, flutters, swishes or rolls. Ideally, you want to feel 10 movements within two hours.    How to wake up baby:   Try not to be alarmed if the fetus hasn’t moved in several hours. It’s normal for fetuses to have periods of rest, sleep and activity -- just like you.  - Taking a walk or moving your body.  - Drinking juice or another sweet beverage.  - Eating a meal.  - Lying down on your left side (this maximizes blood flow).  - Playing loud music.    When to contact the office:   Call the office right away at [phone] if you notice any of the following:  - Your baby moves less than 10 times in two hours while you are doing kick counts.  - Your baby moves much less often than on the days before.  - You have not felt your baby move all day.

## 2024-12-17 NOTE — PROGRESS NOTES
CHERRI  29 year old yo, , at GA 34w3d    Vitals:    24 1429   BP: 108/72   Pulse: 94   Weight: 193 lb 6 oz (87.7 kg)   Height: 65\"       Doing well, +FM  Denies LOF/VB/uctx  Rh negative, s/p RhoGam, TDAP and RSV vaccines received, EPDS 0    Blood sugar log reviewed - only 1 day of elevated blood sugars on her baby shower.     Discussed trajectory of upcoming appointments. Triage precautions reviewed.      Assessment:     Savanah Jansen is a 29 year old  at 34w3d who presents for routine OB visit. Overall doing well.     Problem List Items Addressed This Visit    None  Visit Diagnoses       Gestational diabetes mellitus in childbirth, diet controlled ([facility])    -  Primary                Plan:     Patient Active Problem List    Diagnosis    Diet controlled gestational diabetes mellitus (GDM) in second trimester ([facility])     1 hr - skip 3 hr GTT  [X] Referral diabetes educator, appt 10/07  - 32 week ultrasound EFW 70%ile 2229g AC 80%ile      Rh negative state in antepartum period ([facility])     [X] rhogam administered 2024  Baby is RH positive on NIPT      UTI (urinary tract infection) during pregnancy, unspecified ([facility])     [x] repeat urine cx was negative 2024 (first trimester) --> yeast infection after abx treatment  [X] f/u UA and Ucx for UTI symptoms, ordered 10/07      Overweight (BMI 25.0-29.9)       - continue routine prenatal care   - labor and rupture of membrane precautions provided  - Fetal movement instructions given    Return to clinic in 2 weeks for CHERRI visit       Leta Ríos MD   [facility] - OBGYN      Note to patient and family   The 21st Century Cures Act makes medical notes available to patients in the interest of transparency.  However, please be advised that this is a medical document.  It is intended as btdq-ai-bfel communication.  It is written and medical language may contain abbreviations or verbiage that are technical and unfamiliar.  It may appear blunt or direct.   Medical documents are intended to carry relevant information, facts as evident, and the clinical opinion of the practitioner.

## 2024-12-20 ENCOUNTER — TELEPHONE (OUTPATIENT)
Dept: OBGYN CLINIC | Facility: CLINIC | Age: 29
End: 2024-12-20

## 2024-12-26 ENCOUNTER — TELEPHONE (OUTPATIENT)
Dept: OBGYN CLINIC | Facility: CLINIC | Age: 29
End: 2024-12-26

## 2024-12-26 NOTE — TELEPHONE ENCOUNTER
Patients spouse called stating he will email Family Medical Leave Act  forms for patient. Forms email provided. - details were provided.    PATIENT SPOUSE REQUEST FORMS UPLOADED TO [employer]    Type of Leave: continuous  Reason for Leave: Maternity  Start date of leave: 1/27/25 (ERIBERTO: 1/25/24)  End date of leave: 12 weeks  How many flare ups per month/length?:  How many appts per month/length?:   Was Fee and Turnaround info Given?: Yes

## 2024-12-30 ENCOUNTER — TELEPHONE (OUTPATIENT)
Dept: OBGYN CLINIC | Facility: CLINIC | Age: 29
End: 2024-12-30

## 2024-12-30 ENCOUNTER — ROUTINE PRENATAL (OUTPATIENT)
Dept: OBGYN CLINIC | Facility: CLINIC | Age: 29
End: 2024-12-30
Payer: COMMERCIAL

## 2024-12-30 VITALS
SYSTOLIC BLOOD PRESSURE: 110 MMHG | WEIGHT: 194.13 LBS | HEART RATE: 99 BPM | HEIGHT: 65 IN | DIASTOLIC BLOOD PRESSURE: 68 MMHG | BODY MASS INDEX: 32.35 KG/M2

## 2024-12-30 DIAGNOSIS — O24.410 DIET CONTROLLED GESTATIONAL DIABETES MELLITUS (GDM) IN SECOND TRIMESTER (HCC): ICD-10-CM

## 2024-12-30 DIAGNOSIS — Z34.90 ENCOUNTER FOR SUPERVISION OF NORMAL PREGNANCY, ANTEPARTUM, UNSPECIFIED GRAVIDITY (HCC): Primary | ICD-10-CM

## 2024-12-30 PROCEDURE — 87150 DNA/RNA AMPLIFIED PROBE: CPT | Performed by: OBSTETRICS & GYNECOLOGY

## 2024-12-30 PROCEDURE — 87081 CULTURE SCREEN ONLY: CPT | Performed by: OBSTETRICS & GYNECOLOGY

## 2024-12-30 NOTE — PROGRESS NOTES
[facility]  Obstetrics and Gynecology  Routine OB Visit Progress Note    Subjective:     Savanah Jansen is a 29 year old  at 36w2d who presents for routine OB visit.  Patient reports doing well. Denies contractions, vaginal bleeding or leakage of fluid.  Good fetal movement.    SVE 0/30/-4, posterior, soft   - GBS collected today after discussion    Will schedule IOL between 39-40 weeks; sent request.     Blood sugars reviewed; overall within range     Objective:   /68   Pulse 99   Ht 65\"   Wt 194 lb 2 oz (88.1 kg)   LMP 2024 (Exact Date)   BMI 32.30 kg/m²   ABD: gravid, nontender  FHT: 145   Fundal Height: 37   Cephalic by Leopold's    Assessment:     Savanah Jansen is a 29 year old  at 36w2d who presents for routine OB visit. Overall doing well.     Problem List Items Addressed This Visit    None  Visit Diagnoses       Encounter for supervision of normal pregnancy, antepartum, unspecified  ([facility])    -  Primary    Relevant Orders    Group B Strep PCR            Plan:     Patient Active Problem List    Diagnosis    Diet controlled gestational diabetes mellitus (GDM) in second trimester ([facility])     1 hr - skip 3 hr GTT  [X] Referral diabetes educator, appt 10/07  - 32 week ultrasound EFW 70%ile 2229g AC 80%ile      Rh negative state in antepartum period ([facility])     [X] rhogam administered 2024  Baby is RH positive on NIPT      UTI (urinary tract infection) during pregnancy, unspecified ([facility])     [x] repeat urine cx was negative 2024 (first trimester) --> yeast infection after abx treatment  [X] f/u UA and Ucx for UTI symptoms, ordered 10/07      Overweight (BMI 25.0-29.9)       - continue routine prenatal care   - labor and rupture of membrane precautions provided  - Fetal movement count given  - Labor precautions provided     Return to clinic in 1 week for CHERRI visit     Jonelle Calvo MD   [facility] - OBGYN    Note to patient and family   The  21st Century Cures Act makes medical notes available to patients in the interest of transparency.  However, please be advised that this is a medical document.  It is intended as khyh-ed-pkfy communication.  It is written and medical language may contain abbreviations or verbiage that are technical and unfamiliar.  It may appear blunt or direct.  Medical documents are intended to carry relevant information, facts as evident, and the clinical opinion of the practitioner.

## 2024-12-30 NOTE — TELEPHONE ENCOUNTER
----- Message from Jonelle Calvo sent at 12/30/2024  1:12 PM CST -----  Regarding: IOL  Request IOL at/between this GA: 39-40 wks  Estimated Date of Delivery: 1/25/25  Indication for IOL:  IOL for GDMA1  Time of appointment: 8p  Cervical ripening with cytotec: Yes  OB history/complications: diet controlled diabetes

## 2024-12-30 NOTE — PATIENT INSTRUCTIONS
[facility] Obstetrics & Gynecology  Cytotec Induction of Labor Information     Here is some information regarding the induction process:    - If your most recent cervix check prior to the night of induction finds your cervix to be more favorable, we may change your induction to the following morning.    - Occasionally inductions will be delayed or even moved to a different day due to the need for Labor and Delivery to accommodate laboring patients.  Labor and Delivery will contact you if such a situation arises.  Otherwise, please plan on arriving at your scheduled induction time.    - Your care partner should plan on being with you overnight and through the following day.  There is a couch/daybed available in the labor room for your partner's use.  Policies regarding additional care partners can change periodically.    - Cervical ripening is usually done with doses of a medication called Cytotec/Misoprostol, which is a small tablet placed high in the vagina every 4 hours for up to three doses, or until you go into labor.  The medication is primarily designed to help ripen or soften the cervix, but there is always a small chance that you might go into labor overnight with that medication alone.  If not, pitocin is usually given the following morning through the IV to start the actual induction process.  We will usually try to break the bag of water at some point to further help the induction process.    - You may eat dinner, but at the hospital we usually do not allow you to eat or drink anything.  IV fluids are given to prevent dehydration.    - You may ask for a sleeping pill if you need something to help you sleep.    - As mentioned above, there is a small chance that you might go into labor overnight before the pitocin is started in the morning.  Let the nurse know if you need something to help with pain relief.  Options include IV drugs such as dilaudid if you are in early labor, or an  epidural if you are in active labor and sufficiently dilated.    - We encourage you to move around during the induction process, as this can help the baby descend through the birth canal.

## 2025-01-01 ENCOUNTER — PATIENT MESSAGE (OUTPATIENT)
Dept: OBGYN CLINIC | Facility: CLINIC | Age: 30
End: 2025-01-01

## 2025-01-02 LAB — GROUP B STREP BY PCR FOR PCR OVT: NEGATIVE

## 2025-01-02 NOTE — TELEPHONE ENCOUNTER
Patient 36w5d concerned for very light spotting 1/1  and continues today. Only noted on panty liner, not in toilet or when wiping. Notes cervical exam on 12/30, no sexual intercourse. Aware of fetal movement. L&D precautions provided. Please advise of further recommendations.

## 2025-01-02 NOTE — TELEPHONE ENCOUNTER
Leta Ríos MD to Me     1/2/25  4:18 PM  Agree with triage precautions. No further recommendations at this time.

## 2025-01-05 ENCOUNTER — HOSPITAL ENCOUNTER (OUTPATIENT)
Facility: HOSPITAL | Age: 30
Discharge: HOME | End: 2025-01-05
Attending: OBSTETRICS & GYNECOLOGY | Admitting: OBSTETRICS & GYNECOLOGY
Payer: COMMERCIAL

## 2025-01-05 VITALS
RESPIRATION RATE: 16 BRPM | TEMPERATURE: 98 F | WEIGHT: 193 LBS | SYSTOLIC BLOOD PRESSURE: 100 MMHG | DIASTOLIC BLOOD PRESSURE: 74 MMHG | HEART RATE: 69 BPM | OXYGEN SATURATION: 94 % | HEIGHT: 65 IN | BODY MASS INDEX: 32.15 KG/M2

## 2025-01-05 PROBLEM — O36.8190 DECREASED FETAL MOVEMENTS AFFECTING MANAGEMENT OF MOTHER, ANTEPARTUM (HCC): Status: ACTIVE | Noted: 2025-01-05

## 2025-01-05 PROCEDURE — 59025 FETAL NON-STRESS TEST: CPT | Performed by: OBSTETRICS & GYNECOLOGY

## 2025-01-06 ENCOUNTER — ROUTINE PRENATAL (OUTPATIENT)
Dept: OBGYN CLINIC | Facility: CLINIC | Age: 30
End: 2025-01-06
Payer: COMMERCIAL

## 2025-01-06 ENCOUNTER — ULTRASOUND ENCOUNTER (OUTPATIENT)
Dept: OBGYN CLINIC | Facility: CLINIC | Age: 30
End: 2025-01-06
Payer: COMMERCIAL

## 2025-01-06 VITALS
DIASTOLIC BLOOD PRESSURE: 66 MMHG | SYSTOLIC BLOOD PRESSURE: 106 MMHG | BODY MASS INDEX: 32.65 KG/M2 | WEIGHT: 196 LBS | HEART RATE: 76 BPM | HEIGHT: 65 IN

## 2025-01-06 DIAGNOSIS — Z34.93 THIRD TRIMESTER PREGNANCY (HCC): Primary | ICD-10-CM

## 2025-01-06 DIAGNOSIS — O24.419 GESTATIONAL DIABETES MELLITUS (GDM) IN THIRD TRIMESTER, GESTATIONAL DIABETES METHOD OF CONTROL UNSPECIFIED (HCC): ICD-10-CM

## 2025-01-06 NOTE — PROGRESS NOTES
CHERRI  29 year old yo, , at GA 37w2d    Vitals:    25 1113   BP: 106/66   Pulse: 76   Weight: 196 lb (88.9 kg)   Height: 65\"       Doing well, +FM  Denies LOF/VB/uctx    Evaluated in triage yesterday evening for decreased fetal movement. Endorsing active fetal movement now.     Growth US completed today - EFW 79.2%ile, AC 93.8%, HC >99%ile. Discussed implications and will attempt to move IOL to 39w0d.     SVE /3, thick, posterior     Assessment:     Savanah Jansen is a 29 year old  at 37w2d who presents for routine OB visit. Overall doing well.     Problem List Items Addressed This Visit    None  Visit Diagnoses       Third trimester pregnancy ([facility])    -  Primary    Gestational diabetes mellitus (GDM) in third trimester, gestational diabetes method of control unspecified ([facility])        Relevant Orders    US OB 2nd Trimester Complete >14 wks [77021] (Completed)                Plan:     Patient Active Problem List    Diagnosis    Decreased fetal movements affecting management of mother, antepartum ([facility])    Diet controlled gestational diabetes mellitus (GDM) in second trimester ([facility])     1 hr - skip 3 hr GTT  [X] Referral diabetes educator, appt 10/07  - 32 week ultrasound EFW 70%ile 2229g AC 80%ile      Rh negative state in antepartum period ([facility])     [X] rhogam administered 2024  Baby is RH positive on NIPT      UTI (urinary tract infection) during pregnancy, unspecified ([facility])     [x] repeat urine cx was negative 2024 (first trimester) --> yeast infection after abx treatment  [X] f/u UA and Ucx for UTI symptoms, ordered 10/07      Overweight (BMI 25.0-29.9)       - continue routine prenatal care   - labor and rupture of membrane precautions provided  GBS   Lab Results   Component Value Date    GBS Negative 2024        Fetal movement count given  Labor precautions provided     Mode of delivery: anticipate VD   Scheduled:  PM cytotec    Return to clinic in 1 week  for CHERRI visit.     BP 8/8 today     Leta Ríos MD   [facility] - OBGYN      Note to patient and family   The 21st Century Cures Act makes medical notes available to patients in the interest of transparency.  However, please be advised that this is a medical document.  It is intended as baqh-pi-ytdp communication.  It is written and medical language may contain abbreviations or verbiage that are technical and unfamiliar.  It may appear blunt or direct.  Medical documents are intended to carry relevant information, facts as evident, and the clinical opinion of the practitioner.

## 2025-01-06 NOTE — NST
Nonstress Test   Patient: Savanah Jansen    Gestation: 37w1d    NST:       Variability: Moderate           Accelerations: Yes           Decelerations: None            Baseline: 145 BPM           Uterine Irritability: No                                Contraction Frequency: x1                   Acoustic Stimulator: No           Nonstress Test Interpretation: Reactive                                 Additional Comments

## 2025-01-06 NOTE — PROGRESS NOTES
Pt  EDC 23 presents to L&D with c/o fetal movement not \"feeling as strong\", states she is feeling movement. Pt denies UCs, LOF, vag bleeding. EFM applied.

## 2025-01-06 NOTE — PATIENT INSTRUCTIONS
Labor Instructions    How do I know if it’s true labor?  One of the most important aspects of any pregnancy is being able to recognize the onset of labor.  Unfortunately, on occasion it can be difficult or confusing, especially if you have had one or more children.  Each labor can begin in a different way even if you have had four or five children.    If this is your first child, it is very common to have labor for an average greater than 10 hours; however, there have been rare instances for labor to be two hours or less for a first time mother. It is more important for you to know if this is your second or third baby to realize that any labor after the first is usually shorter.  There is no way to tell how long or short the labor will be. Therefore, please call us if you are unsure labor has started.       Usually, during the last six weeks of pregnancy, Angel-Edouard contractions or “false labor pains occur”.  False labor is generally not very painful though it is not always easy to tell.  You may feel contractions, cramps or uterine tightening somewhere between every 3-30 minutes but they will not continue to get stronger over time.  If you lie down, drink plenty of fluid or walk around, the contractions may go away.   False contractions are very common if you have been active on your feet for several hours.   Women frequently worry about being sent home from the hospital without having their baby (i.e. the labor stopped).  Actually, this is an unnecessary worry, for this is an infrequent occurrence.     True labor usually begins in one of two ways.  In most patients it begins with contractions of the uterus, which are irregular (but not always) in the beginning.  They are cramp-like in character and feel similar to menstrual cramps.  After a while, they become more regular, and they seem to last a little longer, and feel a little sharper.  These symptoms are very important-more important- than the timing of the  contractions.  Having regular (usually closer), longer lasting (35-70 seconds), and sharper (more painful) contractions are the common symptoms of actual labor.  The second way in which labor can begin which occurs in approximately 30% of all patients is the rupture of the bag of water.  This is a sudden gush of watery fluid, usually sufficient to run down your leg and onto the floor, or you may wet a large area of the bed if it happens at night.  There may also be tricking that is uncontrolled.  If you are unsure, please call the office.      When should I call?  When contractions are strong and every 3-5 minutes.  If you have a gush of water or you think you might be leaking fluid.  If you are bleeding heavily.  If your baby is not moving around at least every 1-2 hours.  If you are worried about something.  When you think you are ready to go to the hospital.    Who do I call?  Call the office at [phone].  If the office is closed, the answering service will send a message to the physician on call.  The on call physician will be available for emergency phone calls only.          Can I eat in labor?  It is good to eat a light meal at home before going to the hospital.  Eat foods like crackers, popsicles, soup and fruit.  Avoid foods that are difficult to digest like meat, a lot of dairy products and high fat foods.  DO NOT EAT if you know you are scheduled for a  ().      What will happen when I get to the hospital?  When you arrive at the hospital, you will be admitted and examined.   There are a few factors that will determine if you will be allowed to be up out of bed or if you would need to stay in bed.  The main factors are how well the baby is entering into the pelvis and if the bag of durand is in intact or ruptured.  An intravenous (IV) solution will, with exceptions, be started.  This is extremely important especially for the baby.   Your  will be allowed in the room during your  labor. During the delivery, the nurses will inform you of the hospital policy and how many coaches are allowed.  You may desire pain medication or anesthesia for pain.  You probably discussed some aspects of pain medication with us during your prenatal care.  The various options may also have been discussed in Prenatal Classes.  We utilize IV narcotics and epidural anesthesia when our patients request to have them.  If you chose to have no anesthesia, none will be administered.  A local anesthetic may be used at the time of delivery      What should I to bring to the hospital?  Maternity clothes to go home in  You can bring your own night gown to wear after giving birth, but most women prefer to wear the hospital gown because it may get soiled  Nursing Bra if you are planning to breastfeed  Clothes for your baby to go home in  Baby Car Seat.  Be sure you know how to install it correctly. Please install it before going to the hospital  Routine toiletries like toothbrush, shampoo, hairbrush and etc.   You can bring your favorite pillow, but please put a colored pillow case on it so it doesn’t get mixed up with hospital pillows    How long will I stay in the hospital?  The date you leave the hospital may vary depending on the speed of your recovery.  If you have a vaginal delivery, you will stay in the hospital 24-48 hours after your delivery as long as you aren’t having any complications.    If you have had a , you will stay in the hospital 48-72 hours as long as you aren’t having any complications.       Going Home Instructions  There are no set rules as to what you may do each day or week after you are home.  You will receive discharge instructions to help you each day.  Remember, early ambulation in the hospital is to prevent complications.  Do not let this lull you into a false sense of strength or ability to do certain physical acts which may tire you excessively.  Please call the office within a few days  after you are discharged from the hospital to schedule your post-partum visit, which is usually 4-6 weeks after delivery.    Any medications necessary will be discussed on an individual basis.  If you decide to breastfeed your baby, you should continue your prenatal vitamins.  If you do not breastfeed, simply finish the prenatal vitamins you have.      The staff at [facility] wish you a joyous and exciting birth.  If there is anything we can do to make this a better experience for you please do not hesitate to ask.

## 2025-01-06 NOTE — DISCHARGE INSTRUCTIONS
Discharge Instructions    Diet: ADA  Activity: Normal activity         General Instructions    Call your OB doctor if: Fluid leaking from your vagina;Uterine contractions increasing in intensity and frequency;Vaginal bleeding;Vaginal or rectal pressure;Uterine contractions 10 minutes or closer for 1 to 2 hours;Decrease in fetal movement;Temperature greater than 100F  Early labor comfort measures: Drink fluids and eat small light meals;Take a walk;Relax, sleep, take a warm bath or shower for 30 minutes or less

## 2025-01-06 NOTE — PROGRESS NOTES
Discharge instructions given to pt and discussed. Pt states no questions at this time, verb understanding and agreeable to POC. Pt and  escorted out by this RN with instructions in hand in stable condition.

## 2025-01-07 ENCOUNTER — TELEPHONE (OUTPATIENT)
Dept: OBGYN UNIT | Facility: HOSPITAL | Age: 30
End: 2025-01-07

## 2025-01-07 NOTE — TELEPHONE ENCOUNTER
Dr. Cadena,    *ACKNOWLEDGE BUTTON HAS BEEN MOVED TO THE TOP RIGHT RIBBON*    Please sign off on form if you agree to: Family Medical Leave Act     -Signature page will be the first page scanned  -From your Inbasket, Highlight the patient and click Chart   -Double click the 12/26/24 Forms Completion telephone encounter  -Scroll down to the Media section   -Click the blue Hyperlink: Family Medical Leave Act Dr Cadena 1/7/2025  -Click Acknowledge located in the top right ribbon/menu   -Drag the mouse into the blank space of the document and a + sign will appear. Left click to   electronically sign the document.  -Once signed, simply exit out of the screen and you signature will be saved.     Thank you,    Savanah

## 2025-01-07 NOTE — TELEPHONE ENCOUNTER
Received Family Medical Leave Act form via email. Sending [employer] message for Release of Information. Logged for processing.

## 2025-01-14 ENCOUNTER — ROUTINE PRENATAL (OUTPATIENT)
Dept: OBGYN CLINIC | Facility: CLINIC | Age: 30
End: 2025-01-14
Payer: COMMERCIAL

## 2025-01-14 VITALS
HEART RATE: 79 BPM | DIASTOLIC BLOOD PRESSURE: 72 MMHG | WEIGHT: 197.63 LBS | BODY MASS INDEX: 33 KG/M2 | SYSTOLIC BLOOD PRESSURE: 118 MMHG

## 2025-01-14 DIAGNOSIS — Z34.00 SUPERVISION OF NORMAL FIRST PREGNANCY, ANTEPARTUM (HCC): Primary | ICD-10-CM

## 2025-01-14 DIAGNOSIS — O26.899 RH NEGATIVE STATE IN ANTEPARTUM PERIOD (HCC): ICD-10-CM

## 2025-01-14 DIAGNOSIS — O24.410 DIET CONTROLLED GESTATIONAL DIABETES MELLITUS (GDM), ANTEPARTUM (HCC): ICD-10-CM

## 2025-01-14 DIAGNOSIS — Z67.91 RH NEGATIVE STATE IN ANTEPARTUM PERIOD (HCC): ICD-10-CM

## 2025-01-14 DIAGNOSIS — O23.41: ICD-10-CM

## 2025-01-14 PROBLEM — O36.8190 DECREASED FETAL MOVEMENTS AFFECTING MANAGEMENT OF MOTHER, ANTEPARTUM (HCC): Status: RESOLVED | Noted: 2025-01-05 | Resolved: 2025-01-14

## 2025-01-14 PROCEDURE — 3074F SYST BP LT 130 MM HG: CPT | Performed by: OBSTETRICS & GYNECOLOGY

## 2025-01-14 PROCEDURE — 3078F DIAST BP <80 MM HG: CPT | Performed by: OBSTETRICS & GYNECOLOGY

## 2025-01-14 NOTE — PATIENT INSTRUCTIONS
[facility] Obstetrics & Gynecology   Cytotec Induction of Labor Information       You have been scheduled for induction of labor on the night of 01/17/25 at 7:30 pm.  Here is some information regarding the induction process:     - Occasionally inductions will be delayed or even moved to a different day due to the need for Labor and Delivery to accommodate laboring patients.  Labor and Delivery will contact you if such a situation arises.  Otherwise, please plan on arriving at your scheduled induction time.     - Your care partner should plan on being with you overnight and through the following day.  There is a couch/daybed available in the labor room for your partner's use.  Policies regarding additional care partners can change periodically.     - Cervical ripening is usually done with doses of a medication called Cytotec/Misoprostol, which is a small tablet placed high in the vagina every 4 hours for up to three doses, or until you go into labor.  The medication is primarily designed to help ripen or soften the cervix, but there is always a small chance that you might go into labor overnight with that medication alone.  If not, pitocin is usually given the following morning through the IV to start the actual induction process.  We will usually try to break the bag of water at some point to further help the induction process.     - You may eat dinner, but at the hospital we usually do not allow you to eat or drink anything.  IV fluids are given to prevent dehydration.     - You may ask for a sleeping pill if you need something to help you sleep.     - As mentioned above, there is a small chance that you might go into labor overnight before the pitocin is started in the morning.  Let the nurse know if you need something to help with pain relief.  Options include IV drugs such as dilaudid if you are in early labor, or an epidural if you are in active labor and sufficiently dilated.     - We encourage you  to move around during the induction process, as this can help the baby descend through the birth canal.          Labor Instructions    How do I know if it’s true labor?  One of the most important aspects of any pregnancy is being able to recognize the onset of labor.  Unfortunately, on occasion it can be difficult or confusing, especially if you have had one or more children.  Each labor can begin in a different way even if you have had four or five children.    If this is your first child, it is very common to have labor for an average greater than 10 hours; however, there have been rare instances for labor to be two hours or less for a first time mother. It is more important for you to know if this is your second or third baby to realize that any labor after the first is usually shorter.  There is no way to tell how long or short the labor will be. Therefore, please call us if you are unsure labor has started.       Usually, during the last six weeks of pregnancy, Angel-Edouard contractions or “false labor pains occur”.  False labor is generally not very painful though it is not always easy to tell.  You may feel contractions, cramps or uterine tightening somewhere between every 3-30 minutes but they will not continue to get stronger over time.  If you lie down, drink plenty of fluid or walk around, the contractions may go away.   False contractions are very common if you have been active on your feet for several hours.   Women frequently worry about being sent home from the hospital without having their baby (i.e. the labor stopped).  Actually, this is an unnecessary worry, for this is an infrequent occurrence.     True labor usually begins in one of two ways.  In most patients it begins with contractions of the uterus, which are irregular (but not always) in the beginning.  They are cramp-like in character and feel similar to menstrual cramps.  After a while, they become more regular, and they seem to last a  little longer, and feel a little sharper.  These symptoms are very important-more important- than the timing of the contractions.  Having regular (usually closer), longer lasting (35-70 seconds), and sharper (more painful) contractions are the common symptoms of actual labor.  The second way in which labor can begin which occurs in approximately 30% of all patients is the rupture of the bag of water.  This is a sudden gush of watery fluid, usually sufficient to run down your leg and onto the floor, or you may wet a large area of the bed if it happens at night.  There may also be tricking that is uncontrolled.  If you are unsure, please call the office.      When should I call?  When contractions are strong and every 3-5 minutes.  If you have a gush of water or you think you might be leaking fluid.  If you are bleeding heavily.  If your baby is not moving around at least every 1-2 hours.  If you are worried about something.  When you think you are ready to go to the hospital.    Who do I call?  Call the office at [phone].  If the office is closed, the answering service will send a message to the physician on call.  The on call physician will be available for emergency phone calls only.          Can I eat in labor?  It is good to eat a light meal at home before going to the hospital.  Eat foods like crackers, popsicles, soup and fruit.  Avoid foods that are difficult to digest like meat, a lot of dairy products and high fat foods.  DO NOT EAT if you know you are scheduled for a  ().      What will happen when I get to the hospital?  When you arrive at the hospital, you will be admitted and examined.   There are a few factors that will determine if you will be allowed to be up out of bed or if you would need to stay in bed.  The main factors are how well the baby is entering into the pelvis and if the bag of durand is in intact or ruptured.  An intravenous (IV) solution will, with exceptions, be  started.  This is extremely important especially for the baby.   Your  will be allowed in the room during your labor. During the delivery, the nurses will inform you of the hospital policy and how many coaches are allowed.  You may desire pain medication or anesthesia for pain.  You probably discussed some aspects of pain medication with us during your prenatal care.  The various options may also have been discussed in Prenatal Classes.  We utilize IV narcotics and epidural anesthesia when our patients request to have them.  If you chose to have no anesthesia, none will be administered.  A local anesthetic may be used at the time of delivery      What should I to bring to the hospital?  Maternity clothes to go home in  You can bring your own night gown to wear after giving birth, but most women prefer to wear the hospital gown because it may get soiled  Nursing Bra if you are planning to breastfeed  Clothes for your baby to go home in  Baby Car Seat.  Be sure you know how to install it correctly. Please install it before going to the hospital  Routine toiletries like toothbrush, shampoo, hairbrush and etc.   You can bring your favorite pillow, but please put a colored pillow case on it so it doesn’t get mixed up with hospital pillows    How long will I stay in the hospital?  The date you leave the hospital may vary depending on the speed of your recovery.  If you have a vaginal delivery, you will stay in the hospital 24-48 hours after your delivery as long as you aren’t having any complications.    If you have had a , you will stay in the hospital 48-72 hours as long as you aren’t having any complications.       Going Home Instructions  There are no set rules as to what you may do each day or week after you are home.  You will receive discharge instructions to help you each day.  Remember, early ambulation in the hospital is to prevent complications.  Do not let this lull you into a false sense of  strength or ability to do certain physical acts which may tire you excessively.  Please call the office within a few days after you are discharged from the hospital to schedule your post-partum visit, which is usually 4-6 weeks after delivery.    Any medications necessary will be discussed on an individual basis.  If you decide to breastfeed your baby, you should continue your prenatal vitamins.  If you do not breastfeed, simply finish the prenatal vitamins you have.      The staff at [facility] wish you a joyous and exciting birth.  If there is anything we can do to make this a better experience for you please do not hesitate to ask.

## 2025-01-14 NOTE — PROGRESS NOTES
CHERRI    GA: 38w3d  Vitals:    25 1427   BP: 118/72   Pulse: 79   Weight: 197 lb 9.6 oz (89.6 kg)       Doing well, +FM  Denies LOF/VB/uctx  SVE /-3   Blood glucose log reviewed.  Of note, patient is using continuous glucose monitoring.  Reports fastings range less than 95 and 2-hour postprandials are mainly less than 120 with occasional postprandial as high as 140 but commonly associated with holiday meals.    Assessment:     Savanah Jansen is a 29 year old  at 38w3d who presents for routine OB visit. Overall doing well.     Problem List Items Addressed This Visit          Endocrine and Metabolic    Diet controlled gestational diabetes mellitus (GDM), antepartum ([facility])       Genitourinary and Reproductive    UTI (urinary tract infection) during pregnancy, unspecified ([facility])       Gravid and     Rh negative state in antepartum period ([facility])     Other Visit Diagnoses       Supervision of normal first pregnancy, antepartum ([facility])    -  Primary                Plan:     Patient Active Problem List    Diagnosis    Diet controlled gestational diabetes mellitus (GDM), antepartum ([facility])     1 hr - skip 3 hr GTT  [X] Referral diabetes educator, appt 10/07  - 32 week ultrasound EFW 70%ile 2229g AC 80%ile      Rh negative state in antepartum period ([facility])     [X] rhogam administered 2024  Baby is RH positive on NIPT      UTI (urinary tract infection) during pregnancy, unspecified ([facility])     [x] repeat urine cx was negative 2024 (first trimester) --> yeast infection after abx treatment  [X] f/u UA and Ucx for UTI symptoms, ordered 10/07      Overweight (BMI 25.0-29.9)       - continue routine prenatal care   - labor and rupture of membrane precautions provided  - continue glucose monitoring   - continue with plan for IOL as scheduled     GBS   Lab Results   Component Value Date    GBS Negative 2024      Fetal movement count given  Labor precautions provided       Return to  clinic in 1 week for CHERRI visit             Note to patient and family   The 21st Century Cures Act makes medical notes available to patients in the interest of transparency.  However, please be advised that this is a medical document.  It is intended as [id number] communication.  It is written and medical language may contain abbreviations or verbiage that are technical and unfamiliar.  It may appear blunt or direct.  Medical documents are intended to carry relevant information, facts as evident, and the clinical opinion of the practitioner.

## 2025-01-18 ENCOUNTER — ANESTHESIA EVENT (OUTPATIENT)
Dept: OBGYN UNIT | Facility: HOSPITAL | Age: 30
End: 2025-01-18
Payer: COMMERCIAL

## 2025-01-18 ENCOUNTER — HOSPITAL ENCOUNTER (INPATIENT)
Facility: HOSPITAL | Age: 30
LOS: 4 days | Discharge: HOME | End: 2025-01-22
Attending: OBSTETRICS & GYNECOLOGY | Admitting: OBSTETRICS & GYNECOLOGY
Payer: COMMERCIAL

## 2025-01-18 ENCOUNTER — TELEPHONE (OUTPATIENT)
Dept: OBGYN UNIT | Facility: HOSPITAL | Age: 30
End: 2025-01-18

## 2025-01-18 ENCOUNTER — ANESTHESIA (OUTPATIENT)
Dept: OBGYN UNIT | Facility: HOSPITAL | Age: 30
End: 2025-01-18
Payer: COMMERCIAL

## 2025-01-18 ENCOUNTER — APPOINTMENT (OUTPATIENT)
Dept: OBGYN CLINIC | Facility: HOSPITAL | Age: 30
End: 2025-01-18
Payer: COMMERCIAL

## 2025-01-18 DIAGNOSIS — Z98.891 STATUS POST PRIMARY LOW TRANSVERSE CESAREAN SECTION: Primary | ICD-10-CM

## 2025-01-18 PROBLEM — Z34.90 PREGNANCY (HCC): Status: ACTIVE | Noted: 2025-01-18

## 2025-01-18 LAB
ANTIBODY SCREEN: NEGATIVE
BASOPHILS # BLD AUTO: 0.03 X10(3) UL (ref 0–0.2)
BASOPHILS NFR BLD AUTO: 0.4 %
EOSINOPHIL # BLD AUTO: 0.05 X10(3) UL (ref 0–0.7)
EOSINOPHIL NFR BLD AUTO: 0.7 %
ERYTHROCYTE [DISTWIDTH] IN BLOOD BY AUTOMATED COUNT: 12.8 %
GLUCOSE BLD-MCNC: 61 MG/DL (ref 70–99)
GLUCOSE BLD-MCNC: 89 MG/DL (ref 70–99)
HCT VFR BLD AUTO: 37.8 %
HGB BLD-MCNC: 13.4 G/DL
IMM GRANULOCYTES # BLD AUTO: 0.05 X10(3) UL (ref 0–1)
IMM GRANULOCYTES NFR BLD: 0.7 %
LYMPHOCYTES # BLD AUTO: 2.63 X10(3) UL (ref 1–4)
LYMPHOCYTES NFR BLD AUTO: 34.4 %
MCH RBC QN AUTO: 33.3 PG (ref 26–34)
MCHC RBC AUTO-ENTMCNC: 35.4 G/DL (ref 31–37)
MCV RBC AUTO: 94 FL
MONOCYTES # BLD AUTO: 0.6 X10(3) UL (ref 0.1–1)
MONOCYTES NFR BLD AUTO: 7.9 %
NEUTROPHILS # BLD AUTO: 4.28 X10 (3) UL (ref 1.5–7.7)
NEUTROPHILS # BLD AUTO: 4.28 X10(3) UL (ref 1.5–7.7)
NEUTROPHILS NFR BLD AUTO: 55.9 %
PLATELET # BLD AUTO: 166 10(3)UL (ref 150–450)
RBC # BLD AUTO: 4.02 X10(6)UL
RH BLOOD TYPE: NEGATIVE
T PALLIDUM AB SER QL IA: NONREACTIVE
WBC # BLD AUTO: 7.6 X10(3) UL (ref 4–11)

## 2025-01-18 PROCEDURE — 10907ZC DRAINAGE OF AMNIOTIC FLUID, THERAPEUTIC FROM PRODUCTS OF CONCEPTION, VIA NATURAL OR ARTIFICIAL OPENING: ICD-10-PCS | Performed by: OBSTETRICS & GYNECOLOGY

## 2025-01-18 PROCEDURE — 3E0P7VZ INTRODUCTION OF HORMONE INTO FEMALE REPRODUCTIVE, VIA NATURAL OR ARTIFICIAL OPENING: ICD-10-PCS | Performed by: OBSTETRICS & GYNECOLOGY

## 2025-01-18 PROCEDURE — 3E033VJ INTRODUCTION OF OTHER HORMONE INTO PERIPHERAL VEIN, PERCUTANEOUS APPROACH: ICD-10-PCS | Performed by: OBSTETRICS & GYNECOLOGY

## 2025-01-18 RX ORDER — LIDOCAINE HYDROCHLORIDE 20 MG/ML
5 INJECTION, SOLUTION EPIDURAL; INFILTRATION; INTRACAUDAL; PERINEURAL AS NEEDED
Status: DISCONTINUED | OUTPATIENT
Start: 2025-01-18 | End: 2025-01-19

## 2025-01-18 RX ORDER — SODIUM CHLORIDE 9 MG/ML
10 INJECTION, SOLUTION INTRAMUSCULAR; INTRAVENOUS; SUBCUTANEOUS AS NEEDED
Status: DISCONTINUED | OUTPATIENT
Start: 2025-01-18 | End: 2025-01-19

## 2025-01-18 RX ORDER — CALCIUM CARBONATE 500 MG/1
1000 TABLET, CHEWABLE ORAL EVERY 4 HOURS PRN
Status: DISCONTINUED | OUTPATIENT
Start: 2025-01-18 | End: 2025-01-19 | Stop reason: HOSPADM

## 2025-01-18 RX ORDER — CITRIC ACID/SODIUM CITRATE 334-500MG
30 SOLUTION, ORAL ORAL AS NEEDED
Status: COMPLETED | OUTPATIENT
Start: 2025-01-18 | End: 2025-01-19

## 2025-01-18 RX ORDER — LIDOCAINE HYDROCHLORIDE AND EPINEPHRINE 15; 5 MG/ML; UG/ML
INJECTION, SOLUTION EPIDURAL AS NEEDED
Status: DISCONTINUED | OUTPATIENT
Start: 2025-01-18 | End: 2025-01-19 | Stop reason: SURG

## 2025-01-18 RX ORDER — BUPIVACAINE HYDROCHLORIDE 2.5 MG/ML
30 INJECTION, SOLUTION EPIDURAL; INFILTRATION; INTRACAUDAL AS NEEDED
Status: DISCONTINUED | OUTPATIENT
Start: 2025-01-18 | End: 2025-01-19

## 2025-01-18 RX ORDER — IBUPROFEN 600 MG/1
600 TABLET, FILM COATED ORAL ONCE AS NEEDED
Status: DISCONTINUED | OUTPATIENT
Start: 2025-01-18 | End: 2025-01-19 | Stop reason: HOSPADM

## 2025-01-18 RX ORDER — DEXTROSE, SODIUM CHLORIDE, SODIUM LACTATE, POTASSIUM CHLORIDE, AND CALCIUM CHLORIDE 5; .6; .31; .03; .02 G/100ML; G/100ML; G/100ML; G/100ML; G/100ML
INJECTION, SOLUTION INTRAVENOUS AS NEEDED
Status: DISCONTINUED | OUTPATIENT
Start: 2025-01-18 | End: 2025-01-19 | Stop reason: HOSPADM

## 2025-01-18 RX ORDER — BUPIVACAINE HCL/0.9 % NACL/PF 0.25 %
PLASTIC BAG, INJECTION (ML) EPIDURAL
Status: DISPENSED
Start: 2025-01-18 | End: 2025-01-19

## 2025-01-18 RX ORDER — ACETAMINOPHEN 500 MG
500 TABLET ORAL EVERY 6 HOURS PRN
Status: DISCONTINUED | OUTPATIENT
Start: 2025-01-18 | End: 2025-01-19 | Stop reason: HOSPADM

## 2025-01-18 RX ORDER — NALBUPHINE HYDROCHLORIDE 10 MG/ML
2.5 INJECTION INTRAMUSCULAR; INTRAVENOUS; SUBCUTANEOUS
Status: DISCONTINUED | OUTPATIENT
Start: 2025-01-18 | End: 2025-01-19

## 2025-01-18 RX ORDER — ACETAMINOPHEN 500 MG
1000 TABLET ORAL EVERY 6 HOURS PRN
Status: DISCONTINUED | OUTPATIENT
Start: 2025-01-18 | End: 2025-01-19 | Stop reason: HOSPADM

## 2025-01-18 RX ORDER — HYDROMORPHONE HYDROCHLORIDE 1 MG/ML
1 INJECTION, SOLUTION INTRAMUSCULAR; INTRAVENOUS; SUBCUTANEOUS EVERY 2 HOUR PRN
Status: DISCONTINUED | OUTPATIENT
Start: 2025-01-18 | End: 2025-01-19

## 2025-01-18 RX ORDER — TERBUTALINE SULFATE 1 MG/ML
0.25 INJECTION, SOLUTION SUBCUTANEOUS AS NEEDED
Status: DISCONTINUED | OUTPATIENT
Start: 2025-01-18 | End: 2025-01-19 | Stop reason: HOSPADM

## 2025-01-18 RX ORDER — ONDANSETRON 2 MG/ML
4 INJECTION INTRAMUSCULAR; INTRAVENOUS EVERY 6 HOURS PRN
Status: DISCONTINUED | OUTPATIENT
Start: 2025-01-18 | End: 2025-01-19 | Stop reason: HOSPADM

## 2025-01-18 RX ORDER — BUPIVACAINE HCL/0.9 % NACL/PF 0.25 %
5 PLASTIC BAG, INJECTION (ML) EPIDURAL AS NEEDED
Status: DISCONTINUED | OUTPATIENT
Start: 2025-01-18 | End: 2025-01-19

## 2025-01-18 RX ORDER — LIDOCAINE HYDROCHLORIDE AND EPINEPHRINE 15; 5 MG/ML; UG/ML
5 INJECTION, SOLUTION EPIDURAL AS NEEDED
Status: DISCONTINUED | OUTPATIENT
Start: 2025-01-18 | End: 2025-01-19

## 2025-01-18 RX ORDER — SODIUM CHLORIDE, SODIUM LACTATE, POTASSIUM CHLORIDE, CALCIUM CHLORIDE 600; 310; 30; 20 MG/100ML; MG/100ML; MG/100ML; MG/100ML
INJECTION, SOLUTION INTRAVENOUS CONTINUOUS
Status: DISCONTINUED | OUTPATIENT
Start: 2025-01-18 | End: 2025-01-19 | Stop reason: HOSPADM

## 2025-01-18 RX ADMIN — LIDOCAINE HYDROCHLORIDE AND EPINEPHRINE 5 ML: 15; 5 INJECTION, SOLUTION EPIDURAL at 15:39:00

## 2025-01-18 NOTE — H&P
[facility]  Obstetrics and Gynecology  History & Physical    Savanah Jansen Patient Status:  Inpatient    1995 MRN [id number]   Location [facility] LABOR & DELIVERY Attending Jonelle Calvo MD   Hospital Day 0 PCP Carlos Quarles MD     CC: Patient is here for IOL    SUBJECTIVE:    Savanah Jansen is a 29 year old  female at 39w0d Estimated Date of Delivery: 25 who is being admitted for induction of labor 2/2 GDMA1 at full term. Her current obstetrical history is significant for GDMA1, BMI 32.8, Rh neg.    ERIBERTO Confirmation  LMP: Patient's last menstrual period was 2024 (exact date).  ERIBERTO: 2025, by Last Menstrual Period     OB Ultrasounds:   25  Viable 37w2d intrauterine pregnancy.  Fetal measurements are consistent with established EDC.    Estimated fetal weight 3515 gm (7#12 oz) 79.2%, vertex presentation.  Normal amniotic fluid.      No gross ultrasound evidence of structural abnormalities are seen today.  The patient understands that ultrasound cannot rule out all structural and chromosomal abnormalities.       Obstetric History:   OB History    Para Term  AB Living   1 0 0 0 0 0   SAB IAB Ectopic Multiple Live Births   0 0 0 0 0      # Outcome Date GA Lbr Jim/2nd Weight Sex Type Anes PTL Lv   1 Current              Past Medical History:   Past Medical History:    Gestational diabetes (HCC)    Sinus tarsi syndrome of right foot    Supervision of normal first pregnancy, antepartum (HCC)     Past Surgical History:   Past Surgical History:   Procedure Laterality Date    Insert intrauterine device       Family History:   Family History   Problem Relation Age of Onset    Other (Lupus) Mother     Diabetes Mother      Social History:   Social History     Tobacco Use    Smoking status: Never    Smokeless tobacco: Never   Substance Use Topics    Alcohol use: No     Alcohol/week: 0.0 standard drinks of alcohol       Home Meds: Prescriptions  Prior to Admission[1]  Allergies: Allergies[2]    OBJECTIVE:    Temp:  [98.1 °F (36.7 °C)-98.4 °F (36.9 °C)] 98.4 °F (36.9 °C)  Pulse:  [55-60] 60  Resp:  [16-18] 16  BP: (109-119)/(59-72) 109/59  Body mass index is 32.78 kg/m².    General: AAO. NAD.   Lungs: CTAB  CV: regular rate and rhythm  Abdomen: soft, nontender, nondistended, no abnormal masses, no epigastric pain, gravid   Extremities: negative edema bilaterally, negative calf tenderness bilaterally, Sukh's sign negative bilaterally   SVE: /50/-3 per RN    Reactive NST - baseline 150, moderate variability, +15x15 accels, no decels  [place] - quiet    Prenatal Labs Brief Review   Blood Type:   Lab Results   Component Value Date    ABO O 2025    RH Negative 2025     Lab Results   Component Value Date    GBS Negative 2024          Inpatient labs:  Lab Results   Component Value Date    WBC 7.6 2025    HGB 13.4 2025    HCT 37.8 2025    .0 2025    GLU 89 2025         ASSESSMENT/ PLAN:    Savanah Jansen is a 29 year old  female at 39w0d Estimated Date of Delivery: 25 by LMP c/w 8wk US who is being admitted for IOL.    Patient Active Problem List   Diagnosis    Overweight (BMI 25.0-29.9)    UTI (urinary tract infection) during pregnancy, unspecified (HCC)    Rh negative state in antepartum period (HCC)    Diet controlled gestational diabetes mellitus (GDM), antepartum (HCC)    Pregnancy (HCC)       1. Labor: cervical ripening with cytotec, then plan for pitocin per protocol  2. Fetal monitoring: CEFM, reactive cat 1 on admission  3. GBS: neg  4. Pain: IV meds or epidural prn  5. Maternal co-morbidities   - GDMA1 - no meds, continue to monitor   - Body mass index is 32.78 kg/m².    - Rh neg - baby Rh pos on NIPT, received Rhogam      Risks, benefits, alternatives and possible complications have been discussed in detail with the patient.  Pre-admission, admission, and post admission  procedures and expectations were discussed in detail.  All questions answered, all appropriate consents will be signed at the Hospital. Admission is planned for today.     Admit to L&D, anticipate .    Tonja Salas MD  [facility]  2025 8:24 AM           [1]   Medications Prior to Admission   Medication Sig Dispense Refill Last Dose/Taking    Prenatal Multivit-Min-Fe-FA (PRENATAL VITAMINS OR) Take by mouth.   2025    Misc Natural Products (FIBER 7 OR) Take by mouth.       Glucose Blood (CONTOUR NEXT TEST) In Vitro Strip Check blood sugar 4 times per day for gestational diabetes. 100 strip 3     Microlet Lancets Does not apply Misc Check blood sugar 4 times per day for gestational diabetes. May substitute if not on insurance formulary 100 each 3    [2] No Known Allergies

## 2025-01-18 NOTE — PLAN OF CARE
Problem: Patient/Family Goals  Goal: Patient/Family Long Term Goal  Description: Patient's Long Term Goal:     Interventions:  -   - See additional Care Plan goals for specific interventions  Outcome: Progressing  Goal: Patient/Family Short Term Goal  Description: Patient's Short Term Goal:     Interventions:   -   - See additional Care Plan goals for specific interventions  Outcome: Progressing     Problem: BIRTH - VAGINAL/ SECTION  Goal: Fetal and maternal status remain reassuring during the birth process  Description: INTERVENTIONS:  - Monitor vital signs  - Monitor fetal heart rate  - Monitor uterine activity  - Monitor labor progression (vaginal delivery)  - DVT prophylaxis (C/S delivery)  - Surgical antibiotic prophylaxis (C/S delivery)  Outcome: Progressing     Problem: PAIN - ADULT  Goal: Verbalizes/displays adequate comfort level or patient's stated pain goal  Description: INTERVENTIONS:  - Encourage pt to monitor pain and request assistance  - Assess pain using appropriate pain scale  - Administer analgesics based on type and severity of pain and evaluate response  - Implement non-pharmacological measures as appropriate and evaluate response  - Consider cultural and social influences on pain and pain management  - Manage/alleviate anxiety  - Utilize distraction and/or relaxation techniques  - Monitor for opioid side effects  - Notify MD/LIP if interventions unsuccessful or patient reports new pain  - Anticipate increased pain with activity and pre-medicate as appropriate  Outcome: Progressing     Problem: ANXIETY  Goal: Will report anxiety at manageable levels  Description: INTERVENTIONS:  - Administer medication as ordered  - Teach and rehearse alternative coping skills  - Provide emotional support with 1:1 interaction with staff  Outcome: Progressing

## 2025-01-18 NOTE — ANESTHESIA PROCEDURE NOTES
Labor Analgesia    Date/Time: 1/18/2025 3:30 PM    Performed by: Xander Jamil MD  Authorized by: Xander Jamil MD      General Information and Staff    Start Time:  1/18/2025 3:30 PM  End Time:  1/18/2025 3:39 PM  Anesthesiologist:  Xander Jamil MD  Performed by:  Anesthesiologist  Patient Location:  OB  Site Identification: surface landmarks    Reason for Block: labor epidural    Preanesthetic Checklist: patient identified, IV checked, risks and benefits discussed, monitors and equipment checked, pre-op evaluation, timeout performed, IV bolus, anesthesia consent and sterile technique used      Procedure Details    Patient Position:  Sitting  Prep: ChloraPrep    Monitoring:  Heart rate and continuous pulse ox  Approach:  Midline    Epidural Needle    Injection Technique:  DANY saline  Needle Type:  Tuohy  Needle Gauge:  17 G  Needle Length:  3.375 in  Needle Insertion Depth:  5.5  Location:  L3-4    Spinal Needle      Catheter    Catheter Type:  End hole  Catheter Size:  19 G  Catheter at Skin Depth:  11.5  Test Dose:  Negative    Assessment      Additional Comments

## 2025-01-18 NOTE — ANESTHESIA PREPROCEDURE EVALUATION
PRE-OP EVALUATION    Patient Name: Savanah Jansen    Admit Diagnosis: Pregnancy (HCC) [Z34.90]    Pre-op Diagnosis: * No pre-op diagnosis entered *        Anesthesia Procedure: LABOR ANALGESIA    * No surgeons found in log *    Pre-op vitals reviewed.  Temp: 98.2 °F (36.8 °C)  Pulse: 71  Resp: 16  BP: 133/79  SpO2: 96 %  Body mass index is 32.78 kg/m².    Current medications reviewed.  Hospital Medications:  • lactated ringers infusion   Intravenous Continuous   • dextrose in lactated ringers 5% infusion   Intravenous PRN   • lactated ringers IV bolus 500 mL  500 mL Intravenous PRN   • acetaminophen (Tylenol Extra Strength) tab 500 mg  500 mg Oral Q6H PRN   • acetaminophen (Tylenol Extra Strength) tab 1,000 mg  1,000 mg Oral Q6H PRN   • ibuprofen (Motrin) tab 600 mg  600 mg Oral Once PRN   • ondansetron (Zofran) 4 MG/2ML injection 4 mg  4 mg Intravenous Q6H PRN   • oxyTOCIN in sodium chloride 0.9% (Pitocin) 30 Units/500mL infusion premix  62.5-900 mayda-units/min Intravenous Continuous   • terbutaline (Brethine) 1 MG/ML injection 0.25 mg  0.25 mg Subcutaneous PRN   • sodium citrate-citric acid (Bicitra) 500-334 MG/5ML oral solution 30 mL  30 mL Oral PRN   • calcium carbonate (Tums) chewable tab 1,000 mg  1,000 mg Oral Q4H PRN   • [] misoprostol (CYTOTEC) partial tablets 25 mcg  25 mcg Vaginal 6 times per day   • oxyTOCIN in sodium chloride 0.9% (Pitocin) 30 Units/500mL infusion premix  0.5-20 mayda-units/min Intravenous Continuous   • HYDROmorphone (Dilaudid) 1 MG/ML injection 1 mg  1 mg Intravenous Q2H PRN   • [COMPLETED] lactated ringers IV bolus 1,000 mL  1,000 mL Intravenous Once   • fentaNYL-bupivacaine 2 mcg/mL-0.125% in sodium chloride 0.9% 100 mL EPIDURAL infusion premix  12 mL/hr Epidural Continuous   • fentaNYL (Sublimaze) 50 mcg/mL injection 100 mcg  100 mcg Epidural Once   • lidocaine 1.5%-EPINEPHrine 1:200,000 (Xylocaine-Epinephrine) injection  5 mL Injection PRN   • bupivacaine PF  (Marcaine) 0.25% injection  30 mL Injection PRN   • lidocaine PF (Xylocaine-MPF) 2% injection  5 mL Injection PRN   • sodium chloride 0.9% PF injection 10 mL  10 mL Injection PRN   • ePHEDrine (PF) 25 MG/5 ML injection 5 mg  5 mg Intravenous PRN   • nalbuphine (Nubain) 10 mg/mL injection 2.5 mg  2.5 mg Intravenous Q15 Min PRN   • fentaNYL (Sublimaze) 50 mcg/mL injection       • fentaNYL-bupivacaine in sodium chloride 0.9% 2 mcg/mL-0.125% EPIDURAL infusion premix       • ePHEDrine (PF) 25 MG/5 ML injection           Outpatient Medications:   Prescriptions Prior to Admission[1]    Allergies: Patient has no known allergies.      Anesthesia Evaluation    Patient summary reviewed.    Anesthetic Complications  (-) history of anesthetic complications         GI/Hepatic/Renal    Negative GI/hepatic/renal ROS.                             Cardiovascular    Negative cardiovascular ROS.    Exercise tolerance: good     MET: >4                                           Endo/Other      (+) diabetes  gestational,                          Pulmonary    Negative pulmonary ROS.                       Neuro/Psych                                  Past Surgical History:   Procedure Laterality Date   • Insert intrauterine device       Social History     Socioeconomic History   • Marital status:    Tobacco Use   • Smoking status: Never   • Smokeless tobacco: Never   Vaping Use   • Vaping status: Never Used   Substance and Sexual Activity   • Alcohol use: No     Alcohol/week: 0.0 standard drinks of alcohol   • Drug use: No   • Sexual activity: Yes     Partners: Male   Other Topics Concern   • Caffeine Concern No   • Exercise No   • Seat Belt Yes     History   Drug Use No     Available pre-op labs reviewed.  Lab Results   Component Value Date    WBC 7.6 01/18/2025    RBC 4.02 01/18/2025    HGB 13.4 01/18/2025    HCT 37.8 01/18/2025    MCV 94.0 01/18/2025    MCH 33.3 01/18/2025    MCHC 35.4 01/18/2025    RDW 12.8 01/18/2025    .0  01/18/2025     Lab Results   Component Value Date     11/10/2024    K 4.5 11/10/2024     11/10/2024    CO2 26.0 11/10/2024    BUN 9 11/10/2024    CREATSERUM 0.58 11/10/2024    GLU 89 01/18/2025    CA 9.5 11/10/2024            Airway      Mallampati: II  Mouth opening: >3 FB  TM distance: > 6 cm  Neck ROM: full Cardiovascular    Cardiovascular exam normal.  Rhythm: regular  Rate: normal     Dental             Pulmonary    Pulmonary exam normal.  Breath sounds clear to auscultation bilaterally.               Other findings        ASA: 2   Plan: epidural  NPO status verified and patient meets guidelines.    Post-procedure pain management plan discussed with surgeon and patient.      Plan/risks discussed with: patient            Present on Admission:  • Pregnancy (HCC)  • Diet controlled gestational diabetes mellitus (GDM), antepartum (HCC)             [1]   Medications Prior to Admission   Medication Sig Dispense Refill Last Dose/Taking   • Prenatal Multivit-Min-Fe-FA (PRENATAL VITAMINS OR) Take by mouth.   1/17/2025   • Misc Natural Products (FIBER 7 OR) Take by mouth.      • Glucose Blood (CONTOUR NEXT TEST) In Vitro Strip Check blood sugar 4 times per day for gestational diabetes. 100 strip 3    • Microlet Lancets Does not apply Misc Check blood sugar 4 times per day for gestational diabetes. May substitute if not on insurance formulary 100 each 3

## 2025-01-18 NOTE — PROGRESS NOTES
at 39w0d admitted for IOL.    Vitals:    25 1600 25 1615 25 1630 25 1700   BP: 116/65 111/59 108/60 107/55   BP Location:       Pulse: 68 64 61 64   Resp:       Temp:       TempSrc:       SpO2: 98% 97% 98% 100%   Weight:       Height:             FHT: 145, moderate variability, + accels, no decels  [place]: q3-4 min    SVE: 4/90/high after CRB removed    Recent Labs   Lab 25  0105   HGB 13.4   HCT 37.8   WBC 7.6   .0       Labor progress:  - s/p Cook cervical ripening balloon  - continue expectant management, can restart Pitocin as indicated  - fetal wellbeing reassuring now with cat 1 FHT  - pain: s/p epidural    Dispo: Continue IOL, anticipate     Tonja Salas MD  [facility]  2025 5:34 PM

## 2025-01-18 NOTE — PROGRESS NOTES
at 39w0d admitted for IOL.    Vitals:    25 0118 25 0600 25 0929 25 1130   BP: 119/72 109/59 122/64 133/75   BP Location: Right arm Right arm     Pulse: 55 60 61 69   Resp: 18 16 16    Temp: 98.1 °F (36.7 °C) 98.4 °F (36.9 °C) 98.2 °F (36.8 °C)    TempSrc: Oral Oral Oral    Weight: 197 lb (89.4 kg)      Height: 65\"            FHT: 150, moderate variability, + accels, no decels  [place]: q2-4 min    SVE: /50/-3    Recent Labs   Lab 25  0105   HGB 13.4   HCT 37.8   WBC 7.6   .0       Labor progress:  - received 2 doses of cytotec for cervical ripening, started Pitocin due to variable decels with cytotec, then had tachysystole with Pitocin. Discontinued medications and placed cervical ripening balloon.  - fetal wellbeing reassuring now with cat 1 FHT  - pain: IV meds or epidural prn    Dispo: Continue IOL, anticipate     Tonja Salas MD  [facility]  2025 1:37 PM

## 2025-01-18 NOTE — PROGRESS NOTES
PT is  female admitted to [facility]. Patient represents with scheduled induction for GDM.     PT is 39.0 week gestation intrauterine pregnancy.   History obtained, consents signed. Oriented to room, staff and plan of care.

## 2025-01-19 PROBLEM — Z98.891 STATUS POST PRIMARY LOW TRANSVERSE CESAREAN SECTION: Status: ACTIVE | Noted: 2025-01-19

## 2025-01-19 LAB
ANION GAP SERPL CALC-SCNC: 10 MMOL/L (ref 0–18)
BASOPHILS # BLD AUTO: 0.04 X10(3) UL (ref 0–0.2)
BASOPHILS # BLD AUTO: 0.05 X10(3) UL (ref 0–0.2)
BASOPHILS NFR BLD AUTO: 0.2 %
BASOPHILS NFR BLD AUTO: 0.2 %
BUN BLD-MCNC: 9 MG/DL (ref 9–23)
CALCIUM BLD-MCNC: 8.6 MG/DL (ref 8.7–10.6)
CHLORIDE SERPL-SCNC: 112 MMOL/L (ref 98–112)
CO2 SERPL-SCNC: 20 MMOL/L (ref 21–32)
CREAT BLD-MCNC: 0.62 MG/DL
EGFRCR SERPLBLD CKD-EPI 2021: 122 ML/MIN/1.73M2 (ref 60–?)
EOSINOPHIL # BLD AUTO: 0 X10(3) UL (ref 0–0.7)
EOSINOPHIL # BLD AUTO: 0.11 X10(3) UL (ref 0–0.7)
EOSINOPHIL NFR BLD AUTO: 0 %
EOSINOPHIL NFR BLD AUTO: 0.5 %
ERYTHROCYTE [DISTWIDTH] IN BLOOD BY AUTOMATED COUNT: 13.2 %
ERYTHROCYTE [DISTWIDTH] IN BLOOD BY AUTOMATED COUNT: 13.2 %
FETAL SCREEN RESULT: NEGATIVE
GLUCOSE BLD-MCNC: 106 MG/DL (ref 70–99)
GLUCOSE BLD-MCNC: 92 MG/DL (ref 70–99)
HCT VFR BLD AUTO: 38.6 %
HCT VFR BLD AUTO: 41.7 %
HGB BLD-MCNC: 13.3 G/DL
HGB BLD-MCNC: 14.3 G/DL
IMM GRANULOCYTES # BLD AUTO: 0.15 X10(3) UL (ref 0–1)
IMM GRANULOCYTES # BLD AUTO: 0.21 X10(3) UL (ref 0–1)
IMM GRANULOCYTES NFR BLD: 0.7 %
IMM GRANULOCYTES NFR BLD: 1 %
LYMPHOCYTES # BLD AUTO: 0.85 X10(3) UL (ref 1–4)
LYMPHOCYTES # BLD AUTO: 1.23 X10(3) UL (ref 1–4)
LYMPHOCYTES NFR BLD AUTO: 3.9 %
LYMPHOCYTES NFR BLD AUTO: 5.9 %
MCH RBC QN AUTO: 33 PG (ref 26–34)
MCH RBC QN AUTO: 33.1 PG (ref 26–34)
MCHC RBC AUTO-ENTMCNC: 34.3 G/DL (ref 31–37)
MCHC RBC AUTO-ENTMCNC: 34.5 G/DL (ref 31–37)
MCV RBC AUTO: 96 FL
MCV RBC AUTO: 96.3 FL
MONOCYTES # BLD AUTO: 0.84 X10(3) UL (ref 0.1–1)
MONOCYTES # BLD AUTO: 1.03 X10(3) UL (ref 0.1–1)
MONOCYTES NFR BLD AUTO: 3.9 %
MONOCYTES NFR BLD AUTO: 4.9 %
NEUTROPHILS # BLD AUTO: 18.36 X10 (3) UL (ref 1.5–7.7)
NEUTROPHILS # BLD AUTO: 18.36 X10(3) UL (ref 1.5–7.7)
NEUTROPHILS # BLD AUTO: 19.74 X10 (3) UL (ref 1.5–7.7)
NEUTROPHILS # BLD AUTO: 19.74 X10(3) UL (ref 1.5–7.7)
NEUTROPHILS NFR BLD AUTO: 88.3 %
NEUTROPHILS NFR BLD AUTO: 90.5 %
OSMOLALITY SERPL CALC.SUM OF ELEC: 293 MOSM/KG (ref 275–295)
PLATELET # BLD AUTO: 133 10(3)UL (ref 150–450)
PLATELET # BLD AUTO: 133 10(3)UL (ref 150–450)
POTASSIUM SERPL-SCNC: 3.9 MMOL/L (ref 3.5–5.1)
RBC # BLD AUTO: 4.02 X10(6)UL
RBC # BLD AUTO: 4.33 X10(6)UL
SODIUM SERPL-SCNC: 142 MMOL/L (ref 136–145)
WBC # BLD AUTO: 20.8 X10(3) UL (ref 4–11)
WBC # BLD AUTO: 21.8 X10(3) UL (ref 4–11)

## 2025-01-19 PROCEDURE — 59510 CESAREAN DELIVERY: CPT | Performed by: OBSTETRICS & GYNECOLOGY

## 2025-01-19 PROCEDURE — 59514 CESAREAN DELIVERY ONLY: CPT | Performed by: OBSTETRICS & GYNECOLOGY

## 2025-01-19 PROCEDURE — 10H07YZ INSERTION OF OTHER DEVICE INTO PRODUCTS OF CONCEPTION, VIA NATURAL OR ARTIFICIAL OPENING: ICD-10-PCS | Performed by: OBSTETRICS & GYNECOLOGY

## 2025-01-19 RX ORDER — NICOTINE POLACRILEX 4 MG
15 LOZENGE BUCCAL
Status: DISCONTINUED | OUTPATIENT
Start: 2025-01-19 | End: 2025-01-19 | Stop reason: HOSPADM

## 2025-01-19 RX ORDER — CHOLECALCIFEROL (VITAMIN D3) 25 MCG
1 TABLET,CHEWABLE ORAL DAILY
Status: DISCONTINUED | OUTPATIENT
Start: 2025-01-19 | End: 2025-01-22

## 2025-01-19 RX ORDER — LIDOCAINE HCL/EPINEPHRINE/PF 2%-1:200K
VIAL (ML) INJECTION AS NEEDED
Status: DISCONTINUED | OUTPATIENT
Start: 2025-01-19 | End: 2025-01-19 | Stop reason: SURG

## 2025-01-19 RX ORDER — GABAPENTIN 300 MG/1
300 CAPSULE ORAL EVERY 8 HOURS PRN
Status: DISCONTINUED | OUTPATIENT
Start: 2025-01-19 | End: 2025-01-22

## 2025-01-19 RX ORDER — PHENYLEPHRINE HCL 10 MG/ML
VIAL (ML) INJECTION AS NEEDED
Status: DISCONTINUED | OUTPATIENT
Start: 2025-01-19 | End: 2025-01-19 | Stop reason: SURG

## 2025-01-19 RX ORDER — ONDANSETRON 2 MG/ML
INJECTION INTRAMUSCULAR; INTRAVENOUS AS NEEDED
Status: DISCONTINUED | OUTPATIENT
Start: 2025-01-19 | End: 2025-01-19 | Stop reason: SURG

## 2025-01-19 RX ORDER — ACETAMINOPHEN 500 MG
1000 TABLET ORAL EVERY 6 HOURS
Status: DISCONTINUED | OUTPATIENT
Start: 2025-01-19 | End: 2025-01-22

## 2025-01-19 RX ORDER — NICOTINE POLACRILEX 4 MG
30 LOZENGE BUCCAL
Status: DISCONTINUED | OUTPATIENT
Start: 2025-01-19 | End: 2025-01-19 | Stop reason: HOSPADM

## 2025-01-19 RX ORDER — MORPHINE SULFATE 2 MG/ML
INJECTION, SOLUTION INTRAMUSCULAR; INTRAVENOUS AS NEEDED
Status: DISCONTINUED | OUTPATIENT
Start: 2025-01-19 | End: 2025-01-19 | Stop reason: SURG

## 2025-01-19 RX ORDER — SIMETHICONE 80 MG
80 TABLET,CHEWABLE ORAL 3 TIMES DAILY PRN
Status: DISCONTINUED | OUTPATIENT
Start: 2025-01-19 | End: 2025-01-22

## 2025-01-19 RX ORDER — METHYLERGONOVINE MALEATE 0.2 MG/ML
INJECTION INTRAVENOUS
Status: DISPENSED
Start: 2025-01-19 | End: 2025-01-19

## 2025-01-19 RX ORDER — KETOROLAC TROMETHAMINE 30 MG/ML
INJECTION, SOLUTION INTRAMUSCULAR; INTRAVENOUS
Status: DISPENSED
Start: 2025-01-19 | End: 2025-01-19

## 2025-01-19 RX ORDER — OXYCODONE HYDROCHLORIDE 5 MG/1
5 TABLET ORAL EVERY 6 HOURS PRN
Status: DISCONTINUED | OUTPATIENT
Start: 2025-01-19 | End: 2025-01-22

## 2025-01-19 RX ORDER — BISACODYL 10 MG
10 SUPPOSITORY, RECTAL RECTAL ONCE AS NEEDED
Status: DISCONTINUED | OUTPATIENT
Start: 2025-01-19 | End: 2025-01-22

## 2025-01-19 RX ORDER — KETOROLAC TROMETHAMINE 30 MG/ML
30 INJECTION, SOLUTION INTRAMUSCULAR; INTRAVENOUS EVERY 6 HOURS
Status: DISPENSED | OUTPATIENT
Start: 2025-01-19 | End: 2025-01-20

## 2025-01-19 RX ORDER — NALBUPHINE HYDROCHLORIDE 10 MG/ML
2.5 INJECTION INTRAMUSCULAR; INTRAVENOUS; SUBCUTANEOUS
Status: DISCONTINUED | OUTPATIENT
Start: 2025-01-19 | End: 2025-01-19 | Stop reason: HOSPADM

## 2025-01-19 RX ORDER — AMMONIA 15 % (W/V)
0.3 AMPUL (EA) INHALATION AS NEEDED
Status: DISCONTINUED | OUTPATIENT
Start: 2025-01-19 | End: 2025-01-22

## 2025-01-19 RX ORDER — HYDROMORPHONE HYDROCHLORIDE 1 MG/ML
0.2 INJECTION, SOLUTION INTRAMUSCULAR; INTRAVENOUS; SUBCUTANEOUS EVERY 5 MIN PRN
Status: DISCONTINUED | OUTPATIENT
Start: 2025-01-19 | End: 2025-01-19 | Stop reason: HOSPADM

## 2025-01-19 RX ORDER — DEXAMETHASONE SODIUM PHOSPHATE 10 MG/ML
INJECTION, SOLUTION INTRAMUSCULAR; INTRAVENOUS AS NEEDED
Status: DISCONTINUED | OUTPATIENT
Start: 2025-01-19 | End: 2025-01-19 | Stop reason: SURG

## 2025-01-19 RX ORDER — DOCUSATE SODIUM 100 MG/1
100 CAPSULE, LIQUID FILLED ORAL
Status: DISCONTINUED | OUTPATIENT
Start: 2025-01-19 | End: 2025-01-22

## 2025-01-19 RX ORDER — SODIUM CHLORIDE, SODIUM LACTATE, POTASSIUM CHLORIDE, CALCIUM CHLORIDE 600; 310; 30; 20 MG/100ML; MG/100ML; MG/100ML; MG/100ML
INJECTION, SOLUTION INTRAVENOUS CONTINUOUS
Status: DISCONTINUED | OUTPATIENT
Start: 2025-01-19 | End: 2025-01-22

## 2025-01-19 RX ORDER — ENOXAPARIN SODIUM 100 MG/ML
40 INJECTION SUBCUTANEOUS DAILY
Status: DISCONTINUED | OUTPATIENT
Start: 2025-01-19 | End: 2025-01-22

## 2025-01-19 RX ORDER — SODIUM CHLORIDE, SODIUM LACTATE, POTASSIUM CHLORIDE, CALCIUM CHLORIDE 600; 310; 30; 20 MG/100ML; MG/100ML; MG/100ML; MG/100ML
INJECTION, SOLUTION INTRAVENOUS CONTINUOUS PRN
Status: DISCONTINUED | OUTPATIENT
Start: 2025-01-19 | End: 2025-01-19 | Stop reason: SURG

## 2025-01-19 RX ORDER — DEXTROSE, SODIUM CHLORIDE, SODIUM LACTATE, POTASSIUM CHLORIDE, AND CALCIUM CHLORIDE 5; .6; .31; .03; .02 G/100ML; G/100ML; G/100ML; G/100ML; G/100ML
INJECTION, SOLUTION INTRAVENOUS CONTINUOUS PRN
Status: DISCONTINUED | OUTPATIENT
Start: 2025-01-19 | End: 2025-01-22

## 2025-01-19 RX ORDER — ACETAMINOPHEN 500 MG
1000 TABLET ORAL ONCE
Status: DISCONTINUED | OUTPATIENT
Start: 2025-01-19 | End: 2025-01-19 | Stop reason: HOSPADM

## 2025-01-19 RX ORDER — ONDANSETRON 2 MG/ML
4 INJECTION INTRAMUSCULAR; INTRAVENOUS ONCE AS NEEDED
Status: DISCONTINUED | OUTPATIENT
Start: 2025-01-19 | End: 2025-01-19 | Stop reason: HOSPADM

## 2025-01-19 RX ORDER — HYDROMORPHONE HYDROCHLORIDE 1 MG/ML
INJECTION, SOLUTION INTRAMUSCULAR; INTRAVENOUS; SUBCUTANEOUS
Status: DISPENSED
Start: 2025-01-19 | End: 2025-01-19

## 2025-01-19 RX ORDER — TRANEXAMIC ACID 10 MG/ML
INJECTION, SOLUTION INTRAVENOUS
Status: DISCONTINUED
Start: 2025-01-19 | End: 2025-01-19 | Stop reason: WASHOUT

## 2025-01-19 RX ORDER — MISOPROSTOL 200 UG/1
TABLET ORAL
Status: DISCONTINUED
Start: 2025-01-19 | End: 2025-01-19 | Stop reason: WASHOUT

## 2025-01-19 RX ORDER — ONDANSETRON 2 MG/ML
4 INJECTION INTRAMUSCULAR; INTRAVENOUS EVERY 6 HOURS PRN
Status: DISCONTINUED | OUTPATIENT
Start: 2025-01-19 | End: 2025-01-22

## 2025-01-19 RX ORDER — METHYLERGONOVINE MALEATE 0.2 MG/ML
INJECTION INTRAVENOUS AS NEEDED
Status: DISCONTINUED | OUTPATIENT
Start: 2025-01-19 | End: 2025-01-19 | Stop reason: SURG

## 2025-01-19 RX ORDER — IBUPROFEN 600 MG/1
600 TABLET, FILM COATED ORAL EVERY 6 HOURS
Status: DISCONTINUED | OUTPATIENT
Start: 2025-01-20 | End: 2025-01-22

## 2025-01-19 RX ORDER — HYDROMORPHONE HYDROCHLORIDE 1 MG/ML
0.4 INJECTION, SOLUTION INTRAMUSCULAR; INTRAVENOUS; SUBCUTANEOUS EVERY 5 MIN PRN
Status: DISCONTINUED | OUTPATIENT
Start: 2025-01-19 | End: 2025-01-19 | Stop reason: HOSPADM

## 2025-01-19 RX ORDER — KETOROLAC TROMETHAMINE 30 MG/ML
30 INJECTION, SOLUTION INTRAMUSCULAR; INTRAVENOUS ONCE
Status: COMPLETED | OUTPATIENT
Start: 2025-01-19 | End: 2025-01-19

## 2025-01-19 RX ORDER — CARBOPROST TROMETHAMINE 250 UG/ML
INJECTION, SOLUTION INTRAMUSCULAR
Status: DISCONTINUED
Start: 2025-01-19 | End: 2025-01-19 | Stop reason: WASHOUT

## 2025-01-19 RX ORDER — DEXTROSE MONOHYDRATE 25 G/50ML
50 INJECTION, SOLUTION INTRAVENOUS
Status: DISCONTINUED | OUTPATIENT
Start: 2025-01-19 | End: 2025-01-19 | Stop reason: HOSPADM

## 2025-01-19 RX ORDER — HYDROMORPHONE HYDROCHLORIDE 1 MG/ML
0.3 INJECTION, SOLUTION INTRAMUSCULAR; INTRAVENOUS; SUBCUTANEOUS EVERY 2 HOUR PRN
Status: DISCONTINUED | OUTPATIENT
Start: 2025-01-19 | End: 2025-01-22

## 2025-01-19 RX ADMIN — MORPHINE SULFATE 3 MG: 2 INJECTION, SOLUTION INTRAMUSCULAR; INTRAVENOUS at 06:32:00

## 2025-01-19 RX ADMIN — ONDANSETRON 4 MG: 2 INJECTION INTRAMUSCULAR; INTRAVENOUS at 06:24:00

## 2025-01-19 RX ADMIN — SODIUM CHLORIDE, SODIUM LACTATE, POTASSIUM CHLORIDE, CALCIUM CHLORIDE: 600; 310; 30; 20 INJECTION, SOLUTION INTRAVENOUS at 05:58:00

## 2025-01-19 RX ADMIN — LIDOCAINE HCL/EPINEPHRINE/PF 20 ML: 2%-1:200K VIAL (ML) INJECTION at 05:58:00

## 2025-01-19 RX ADMIN — METHYLERGONOVINE MALEATE 0.2 MG: 0.2 INJECTION INTRAVENOUS at 06:21:00

## 2025-01-19 RX ADMIN — PHENYLEPHRINE HCL 100 MCG: 10 MG/ML VIAL (ML) INJECTION at 06:12:00

## 2025-01-19 RX ADMIN — DEXAMETHASONE SODIUM PHOSPHATE 4 MG: 10 INJECTION, SOLUTION INTRAMUSCULAR; INTRAVENOUS at 06:23:00

## 2025-01-19 NOTE — PROGRESS NOTES
at 39w1d admitted for IOL.    Vitals:    25 0437 25 0440 25 0447 25 0450   BP: 130/67 130/67 110/64 110/64   BP Location: Left arm Left arm Left arm Left arm   Pulse: 97 97 73 73   Resp:       Temp:       TempSrc:       SpO2: 98% 98% 97% 97%   Weight:       Height:             FHT: 185, minimal to moderate variability, no accels, recurrent late decels  [place]: q3-4 min    SVE: 6/90/-1    Recent Labs   Lab 25  0105   HGB 13.4   HCT 37.8   WBC 7.6   .0       Labor progress:  - s/p Cook cervical ripening balloon  - continue Pitocin, slow increase due to fetal intolerance to labor. IUPC placed and so far ctx appear inadequate.  - AROM at 2245 (possibly SROM before this)  - fetal wellbeing - cat 2 concerning with fetal tachycardia, minimal variability, late decels. Maternal temp remains afebrile. Given PO tylenol to try to help FHR.   - pain: s/p epidural, bolus prn  - counseled patient on the possible need for  delivery due to failed IOL, arrest of dilation, and inability to augment due to FITL.Will give a few minutes of monitoring with IUPC prior to making final decision.    Dispo: Continue IOL    Tonja Salas MD  [facility]  2025 5:06 AM

## 2025-01-19 NOTE — PROGRESS NOTES
Patient transferred to mother/baby room 2213 per cart in stable condition with baby and personal belongings.  Accompanied by  and staff.  Report given to mother/baby RN.

## 2025-01-19 NOTE — OPERATIVE REPORT
[facility]   Section - Operative Note    Savanah Jansen Patient Status:  Inpatient    1995 MRN [id number]   Location [facility] LABOR & DELIVERY Attending Jonelle Calvo MD   Hosp Day # 1 PCP Carlos Quarles MD       Preoperative Diagnosis:   [place] IUP at 39w1d   Fetal intolerance to labor  Arrest of dilation    Postoperative Diagnosis: Same with live     Procedure:   Primary Low Transverse  Section    Primary surgeon: Tonja Salas MD      Assistant:  Ivy Mcintyre MD    Indications:  Savanah Jansen is a 29 year old  at 39w1d admitted for induction of labor. She was initiated with cytotec cervical ripening and then progressed to pitocin, but due to non-reassuring fetal heart tones the pitocin was unable to be increased. A cervical ripening balloon was then used with successful cervical ripening. Attempted to continue induction with Pitocin and AROM, but she still did not progress past 6 cm and then unable to increase pitocin again due to fetal intolerance. Patient's temperature started to increase and non-reassuring FHT noted with fetal tachycardia and recurrent late decelerations, so the decision made to proceed with  delivery.      Surgical Findings:   Nita Jansen [[id number]]      Labor Events     labor?: No   steroids?: None  Rupture date/time: 2025 2245     Rupture type: SROM  Fluid color: Clear  Labor type: Induced Onset of Labor  Induction: Misoprostol, Cervical Ripening Balloon, Oxytocin  Indications for induction: IDDM/GDDM  Intrapartum & labor complications: Other - see comments  Intrapartum & labor complications comment: Gestational diabetes- diet controlled        Labor Event Times    Labor onset date/time: 2025 1500  Decision date/time (emergent ): 2025 0538       [place] Presentation    Presentation: Vertex  Position: Occiput Posterior       Operative Delivery    Operative Vaginal  Delivery: N/A                Shoulder Dystocia    Shoulder Dystocia: N/A       Anesthesia    Method: Epidural              [place] Delivery      Head delivery date/time: 1950 06:16:50   Delivery date/time:  25 06:16:59   Delivery type: Caesarean Section    Details:   categorization: Primary    priority: unscheduled   Indications for : Fetal Intolerance of Labor, Arrest of Dilatation   Other Comment for Indications for : fetal tachycardia   Skin incision type: 1 Pfannenstiel      Delivery location: delivery room       Delivery Providers    Delivering Clinician: Tonja Muhammad MD   Delivery personnel:  Provider Role   Sheree Navarro RN Baby Nurse   Vandana Hyde RN Delivery Nurse   Abhay Vallejo MD Neonatologist   Doug Riojas MD OR/Delivery Anesthesiologist             Cord    Vessels: 3 Vessels  Complications: None  Timed cord clamping: Yes  Time in sec: 35  Cord blood disposition: to lab  Gases sent?: No       Resuscitation    Method: None        Measurements      Weight: 3710 g 8 lb 2.9 oz Length: 49.5 cm     Head circum.: 37 cm Chest circum.: 35.5 cm      Abdominal circum.: 34 cm           Placenta    Date/time: 2025 0617  Removal: Spontaneous  Disposition: Pathology       Apgars    Living status: Living   Apgar Scoring Key:    0 1 2    Skin color Blue or pale Acrocyanotic Completely pink    Heart rate Absent <100 bpm >100 bpm    Reflex irritability No response Grimace Cry or active withdrawal    Muscle tone Limp Some flexion Active motion    Respiratory effort Absent Weak cry; hypoventilation Good, crying              1 Minute:  5 Minute:  10 Minute:  15 Minute:  20 Minute:      Skin color: 0  1       Heart rate: 2  2       Reflex irritablity: 2  2       Muscle tone: 2  2       Respiratory effort: 2  2       Total: 8  9          Apgars assigned by: DEAN   disposition: with mother       Skin to Skin    No data filed        Vaginal Count    Initial count RN: Vandana Hyde RN  Initial count Tech: Son Wilson    Initial counts 11   0    Final counts               Lacerations    Episiotomy: None  Perineal lacerations: None      Vaginal laceration?: No      Cervical laceration?: No    Clitoral laceration?: No    Quantitative blood loss (mL): 595                 Procedure:   The patient was prepped and draped in the usual sterile fashion. A time out was performed and scd’s were placed to decrease her risk for DVT and a dose of antibiotics was given preoperatively to decrease her risk for infection. A carbone catheter was already in place. After adequate level of anesthesia was ascertained, a Pfannenstiel incision was made and extended down to the level of the fascia. The fascia was incised and extended laterally with Barnhart scissors.  The rectus muscles were  superiorly and inferiorly from the underlying fascia.  The peritoneal cavity was entered superiorly and extended inferiorly.     The bladder retractor was placed was placed and the bladder flap was developed. A low transverse incision was created using the scalpel and extended laterally bluntly. The amniotic fluid was meconium stained. The infants head was found to be OP and not well engaged in the pelvis. The head was brought to the incision, and delivered atraumatically, with fundal pressure given by the assistant. No nuchal cord. The infant's nose and mouth were bulb suctioned. The cord was clamped and cut after delay. Vigorous cry. Cord blood/gasses obtained. The infant was placed in the radiant warmer with Neonatology present.      The placenta was delivered intact with a 3 vessel cord. The uterus was exteriorized. The uterine cavity was swept clean using a wet lap. The first layer of the hysterotomy was closed using 0 Vicryl.  A second imbricating layer was placed with 1 Monocryl. An interrupted stitch was needed for hemostasis on the left aspect  and noted that the uterine artery needed to be included in the stitch, essentially performing an O'Boyers stitch. The incision was inspected again for hemostasis. Tubes and ovaries appeared normal. The uterus was then returned to the abdomen.      The peritoneum and rectus muscles were examined and found to be hemostatic. The fascia was closed with 0 PDS in a running fashion. The subcutaneous tissue was closed with 3-0 vicryl. The skin was closed with 3-0 monocryl.      The sponge and instrument counts were correct x3 and scanner was clear. QBL 595cc.  The patient tolerated the procedure and she and baby \"Angelique\" were brought in stable condition to the recovery room.    Drains: carbone to dependant drainage - with clear urine    Condition:   stable    Complications: None      Tonja Salas MD  [facility]  1/19/2025 7:09 AM

## 2025-01-19 NOTE — PLAN OF CARE
Problem: Patient/Family Goals  Goal: Patient/Family Long Term Goal  Description: Patient's Long Term Goal: Uncomplicated vaginal delivery     Interventions:   VS per protocol   I&O   Ice chips and sips as tolerated   EFM per protocol   Maintain IV as ordered   Antibiotics as needed per protocol   Informed consent   - See additional Care Plan goals for specific interventions  2025 by Vandana Hyde RN  Outcome: Progressing  2025 by Vandana Hyde RN  Outcome: Progressing  Goal: Patient/Family Short Term Goal  Description: Patient's Short Term Goal: pain management  Interventions:   - epidural when warranted   -breathing techniques   -relaxation   - See additional Care Plan goals for specific interventions  2025 by Vandana Hyde RN  Outcome: Progressing  2025 by Vandana Hyde RN  Outcome: Progressing     Problem: BIRTH - VAGINAL/ SECTION  Goal: Fetal and maternal status remain reassuring during the birth process  Description: INTERVENTIONS:  - Monitor vital signs  - Monitor fetal heart rate  - Monitor uterine activity  - Monitor labor progression (vaginal delivery)  - DVT prophylaxis (C/S delivery)  - Surgical antibiotic prophylaxis (C/S delivery)  2025 by Vandana Hyde RN  Outcome: Progressing  2025 by Vandana Hyde RN  Outcome: Progressing     Problem: PAIN - ADULT  Goal: Verbalizes/displays adequate comfort level or patient's stated pain goal  Description: INTERVENTIONS:  - Encourage pt to monitor pain and request assistance  - Assess pain using appropriate pain scale  - Administer analgesics based on type and severity of pain and evaluate response  - Implement non-pharmacological measures as appropriate and evaluate response  - Consider cultural and social influences on pain and pain management  - Manage/alleviate anxiety  - Utilize distraction and/or relaxation techniques  - Monitor for opioid side effects  - Notify MD/LIP if interventions  unsuccessful or patient reports new pain  - Anticipate increased pain with activity and pre-medicate as appropriate  1/18/2025 2106 by Vandana Hyde, RN  Outcome: Progressing  1/18/2025 2105 by Vandana Hyde, RN  Outcome: Progressing     Problem: ANXIETY  Goal: Will report anxiety at manageable levels  Description: INTERVENTIONS:  - Administer medication as ordered  - Teach and rehearse alternative coping skills  - Provide emotional support with 1:1 interaction with staff  1/18/2025 2106 by Vandana Hyde, RN  Outcome: Progressing  1/18/2025 2105 by Vandana Hyde, RN  Outcome: Progressing

## 2025-01-19 NOTE — PLAN OF CARE
Problem: RESPIRATORY - ADULT  Goal: Achieves optimal ventilation and oxygenation  Description: INTERVENTIONS:  - Assess for changes in respiratory status  - Assess for changes in mentation and behavior  - Position to facilitate oxygenation and minimize respiratory effort  - Oxygen supplementation based on oxygen saturation or ABGs  - Provide Smoking Cessation handout, if applicable  - Encourage broncho-pulmonary hygiene including cough, deep breathe, Incentive Spirometry  - Assess the need for suctioning and perform as needed  - Assess and instruct to report SOB or any respiratory difficulty  - Respiratory Therapy support as indicated  - Manage/alleviate anxiety  - Monitor for signs/symptoms of CO2 retention  Outcome: Progressing     Problem: GASTROINTESTINAL - ADULT  Goal: Minimal or absence of nausea and vomiting  Description: INTERVENTIONS:  - Maintain adequate hydration with IV or PO as ordered and tolerated  - Nasogastric tube to low intermittent suction as ordered  - Evaluate effectiveness of ordered antiemetic medications  - Provide nonpharmacologic comfort measures as appropriate  - Advance diet as tolerated, if ordered  - Obtain nutritional consult as needed  - Evaluate fluid balance  Outcome: Progressing  Goal: Maintains or returns to baseline bowel function  Description: INTERVENTIONS:  - Assess bowel function  - Maintain adequate hydration with IV or PO as ordered and tolerated  - Evaluate effectiveness of GI medications  - Encourage mobilization and activity  - Obtain nutritional consult as needed  - Establish a toileting routine/schedule  - Consider collaborating with pharmacy to review patient's medication profile  Outcome: Progressing  Goal: Maintains adequate nutritional intake (undernourished)  Description: INTERVENTIONS:  - Monitor percentage of each meal consumed  - Identify factors contributing to decreased intake, treat as appropriate  - Assist with meals as needed  - Monitor I&O, WT and lab  values  - Obtain nutritional consult as needed  - Optimize oral hygiene and moisture  - Encourage food from home; allow for food preferences  - Enhance eating environment  Outcome: Progressing  Goal: Achieves appropriate nutritional intake (bariatric)  Description: INTERVENTIONS:  - Monitor for over-consumption  - Identify factors contributing to increased intake, treat as appropriate  - Monitor I&O, WT and lab values  - Obtain nutritional consult as needed  - Evaluate psychosocial factors contributing to over-consumption  Outcome: Progressing     Problem: GENITOURINARY - ADULT  Goal: Absence of urinary retention  Description: INTERVENTIONS:  - Assess patient’s ability to void and empty bladder  - Monitor intake/output and perform bladder scan as needed  - Follow urinary retention protocol/standard of care  - Consider collaborating with pharmacy to review patient's medication profile  - Implement strategies to promote bladder emptying  Outcome: Progressing     Problem: POSTPARTUM  Goal: Long Term Goal:Experiences normal postpartum course  Description: INTERVENTIONS:  - Assess and monitor vital signs and lab values.  - Assess fundus and lochia.  - Provide ice/sitz baths for perineum discomfort.  - Monitor healing of incision/episiotomy/laceration, and assess for signs and symptoms of infection and hematoma.  - Assess bladder function and monitor for bladder distention.  - Provide/instruct/assist with pericare as needed.  - Provide VTE prophylaxis as needed.  - Monitor bowel function.  - Encourage ambulation and provide assistance as needed.  - Assess and monitor emotional status and provide social service/psych resources as needed.  - Utilize standard precautions and use personal protective equipment as indicated. Ensure aseptic care of all intravenous lines and invasive tubes/drains.  - Obtain immunization and exposure to communicable diseases history.  Outcome: Progressing  Goal: Optimize infant feeding at the  breast  Description: INTERVENTIONS:  - Initiate breast feeding within first hour after birth.   - Monitor effectiveness of current breast feeding efforts.  - Assess support systems available to mother/family.  - Identify cultural beliefs/practices regarding lactation, letdown techniques, maternal food preferences.  - Assess mother's knowledge and previous experience with breast feeding.  - Provide information as needed about early infant feeding cues (e.g., rooting, lip smacking, sucking fingers/hand) versus late cue of crying.  - Discuss/demonstrate breast feeding aids (e.g., infant sling, nursing footstool/pillows, and breast pumps).  - Encourage mother/other family members to express feelings/concerns, and actively listen.  - Educate father/SO about benefits of breast feeding and how to manage common lactation challenges.  - Recommend avoidance of specific medications or substances incompatible with breast feeding.  - Assess and monitor for signs of nipple pain/trauma.  - Instruct and provide assistance with proper latch.  - Review techniques for milk expression (breast pumping) and storage of breast milk. Provide pumping equipment/supplies, instructions and assistance, as needed.  - Encourage rooming-in and breast feeding on demand.  - Encourage skin-to-skin contact.  - Provide LC support as needed.  - Assess for and manage engorgement.  - Provide breast feeding education handouts and information on community breast feeding support.   Outcome: Progressing  Goal: Establishment of adequate milk supply with medication/procedure interruptions  Description: INTERVENTIONS:  - Review techniques for milk expression (breast pumping).   - Provide pumping equipment/supplies, instructions, and assistance until it is safe to breastfeed infant.  Outcome: Progressing  Goal: Experiences normal breast weaning course  Description: INTERVENTIONS:  - Assess for and manage engorgement.  - Instruct on breast care.  - Provide comfort  measures.  Outcome: Progressing  Goal: Appropriate maternal -  bonding  Description: INTERVENTIONS:  - Assess caregiver- interactions.  - Assess caregiver's emotional status and coping mechanisms.  - Encourage caregiver to participate in  daily care.  - Assess support systems available to mother/family.  - Provide /case management support as needed.  Outcome: Progressing

## 2025-01-19 NOTE — PROGRESS NOTES
Report given to Trina DEJESUS RN. POC discussed and pt stable and in recovery after , pt doing skin to skin with infant at time. Spouse at bedside

## 2025-01-19 NOTE — PLAN OF CARE
Problem: Patient/Family Goals  Goal: Patient/Family Long Term Goal  Description: Patient's Long Term Goal: Uncomplicated vaginal delivery     Interventions:   VS per protocol   I&O   Ice chips and sips as tolerated   EFM per protocol   Maintain IV as ordered   Antibiotics as needed per protocol   Informed consent   - See additional Care Plan goals for specific interventions  Outcome: Completed  Goal: Patient/Family Short Term Goal  Description: Patient's Short Term Goal: pain management  Interventions:   - epidural when warranted   -breathing techniques   -relaxation   - See additional Care Plan goals for specific interventions  Outcome: Completed     Problem: BIRTH - VAGINAL/ SECTION  Goal: Fetal and maternal status remain reassuring during the birth process  Description: INTERVENTIONS:  - Monitor vital signs  - Monitor fetal heart rate  - Monitor uterine activity  - Monitor labor progression (vaginal delivery)  - DVT prophylaxis (C/S delivery)  - Surgical antibiotic prophylaxis (C/S delivery)  Outcome: Completed     Problem: PAIN - ADULT  Goal: Verbalizes/displays adequate comfort level or patient's stated pain goal  Description: INTERVENTIONS:  - Encourage pt to monitor pain and request assistance  - Assess pain using appropriate pain scale  - Administer analgesics based on type and severity of pain and evaluate response  - Implement non-pharmacological measures as appropriate and evaluate response  - Consider cultural and social influences on pain and pain management  - Manage/alleviate anxiety  - Utilize distraction and/or relaxation techniques  - Monitor for opioid side effects  - Notify MD/LIP if interventions unsuccessful or patient reports new pain  - Anticipate increased pain with activity and pre-medicate as appropriate  Outcome: Completed     Problem: ANXIETY  Goal: Will report anxiety at manageable levels  Description: INTERVENTIONS:  - Administer medication as ordered  - Teach and rehearse  alternative coping skills  - Provide emotional support with 1:1 interaction with staff  Outcome: Completed

## 2025-01-19 NOTE — ANESTHESIA POSTPROCEDURE EVALUATION
[facility]    Savanah Jansen Patient Status:  Inpatient   Age/Gender 29 year old female MRN [id number]   Location [facility] LABOR & DELIVERY Attending Jonelle Calvo MD   Hosp Day # 1 PCP Carlos Quarles MD       Anesthesia Post-op Note     SECTION    Procedure Summary       Date: 25 Room / Location:  L+D OR 03 /  L+D OR    Anesthesia Start: 1530 Anesthesia Stop: 25 0701    Procedure:  SECTION Diagnosis:     Surgeons: Tonja Muhammad MD Anesthesiologist: Doug Riojas MD    Anesthesia Type: epidural ASA Status: 2            Anesthesia Type: epidural    Vitals Value Taken Time   /75 25 0700   Temp 99.9 25 0704   Pulse 75 25 0703   Resp 13 25 0703   SpO2 99 % 25 0703   Vitals shown include unfiled device data.        Patient Location: PACU    Anesthesia Type: spinal    Airway Patency: patent    Postop Pain Control: adequate    Mental Status: preanesthetic baseline    Nausea/Vomiting: none    Cardiopulmonary/Hydration status: stable euvolemic    Complications: no apparent anesthesia related complications    Postop vital signs: stable    Dental Exam: Unchanged from Postop

## 2025-01-19 NOTE — PROGRESS NOTES
Counseling.    Due to fetal intolerance to labor, remote from delivery due to arrest of dilation, decision made to proceed with  delivery.    Risks, benefits and alternatives were discussed with patient including but not limited to risk of infection, injury to surrounding organs such as bowel, bladder, ureters, nerves, blood vessels, and baby. Risk of bleeding, need for blood transfusion.   Epidural anesthesia will be dosed for adequate pain control.    Patient verbalized understanding and is agreeable to proceed with .    Tonja Salas MD  [facility]  2025 5:53 AM

## 2025-01-19 NOTE — PROGRESS NOTES
at 39w0d admitted for IOL.    Vitals:    25 2235 25 2240 25 2245 25 2250   BP: 99/56 105/55 129/64 120/68   BP Location: Left arm Left arm Left arm Left arm   Pulse: 69 62 61 63   Resp:       Temp:       TempSrc:       SpO2: 97% 96% 100% 98%   Weight:       Height:             FHT: 135, moderate variability, + accels, early decels  [place]: q4 min    SVE: 6/90/-2    Recent Labs   Lab 25  0105   HGB 13.4   HCT 37.8   WBC 7.6   .0       Labor progress:  - s/p Cook cervical ripening balloon  - Pitocin was restarted, but now discontinued due to NRFHT  - AROM at 2245 (possibly SROM before this)  - fetal wellbeing reassuring now with cat 1 FHT  - pain: s/p epidural    Dispo: Continue IOL, anticipate     Tonja Salas MD  [facility]  2025 11:30 PM

## 2025-01-20 NOTE — PROGRESS NOTES
[facility]  Obstetrics and Gynecology    OB/GYN: Postpartum Progress Note     SUBJECTIVE:  Patient is a 29 year old  female who is s/p PCS. She is POD# 1.   Doing well.  Denies fever, chills, N, V, chest pain and SOB. Bleeding has been stable.  Voiding without difficulty.  Passing flatus.  denies Bm. Tolerating diet. Ambulating without difficulty.     OBJECTIVE:  Vitals:    25 2205 25 2344 25 0330 25 0753   BP: 90/60 95/63 93/62 92/60   Pulse: 65 60 61 60   Resp:  16 16 16   Temp:    97.4 °F (36.3 °C)   TempSrc:    Oral   SpO2:       Weight:       Height:           Physical Exam:  Lungs:   CTAB    Cardiovascular:   RRR      General:    AAA. NAD.    Bowel Sounds:  active   Abdomen Soft, nontender, nondistended, +BS    Lochia:  appropriate   Uterine Fundus:   firm below umbilicus   Incision:  healing well, no significant drainage, no dehiscence, no significant erythema    DVT Evaluation:  No evidence of DVT seen on physical exam.  Negative Sukh's sign.  No cords or calf tenderness.  No significant calf/ankle edema.     Urinary output is adequate.   I/O last 3 completed shifts:  In: 3374.7 [I.V.:2239.7; IV PIGGYBACK:1135]  Out: 3395 [Urine:2800; Blood:595]      Labs:  Recent Labs   Lab 25  1528   RBC 4.02   HGB 13.3   HCT 38.6   MCV 96.0   MCH 33.1   MCHC 34.5   RDW 13.2   NEPRELIM 18.36*   WBC 20.8*   .0*       ASSESSMENT/PLAN:  Patient is a 29 year old  female who is s/p PCS POD# 1.     Doing well   Continue routine postpartum care  Vitals per routine   Encourage ambulation and IS use   Plan for discharge to home POD#3  Follow up in 2 weeks        Alyssia Decker MD   [facility] - [facility]      Note to patient and family   The  Century Cures Act makes medical notes available to patients in the interest of transparency.  However, please be advised that this is a medical document.  It is intended as gesv-cm-wxfw communication.  It is written and medical language  may contain abbreviations or verbiage that are technical and unfamiliar.  It may appear blunt or direct.  Medical documents are intended to carry relevant information, facts as evident, and the clinical opinion of the practitioner.

## 2025-01-20 NOTE — PROGRESS NOTES
[facility] lois Jansen Patient Status:  Inpatient   Age/Gender 29 year old female MRN [id number]   Location [facility] Attending Jonelle Calvo MD   Hosp Day # 2 PCP Carlos Quarles MD      Anesthesia Pain Progress Note    Anesthesia Technique:   Epidural Anesthesia     Pain Management Technique:  In addition to available oral supplemental and IV medications  Patient received neuraxial preservative free morphine for post procedural pain control.    Post Procedure Pain Quality:    Adequate    Pain Management Side Effects:  None     BP 92/60 (BP Location: Left arm)   Pulse 60   Temp 97.4 °F (36.3 °C) (Oral)   Resp 16   Ht 1.651 m (5' 5\")   Wt 89.4 kg (197 lb)   LMP 2024 (Exact Date)   SpO2 94%   Breastfeeding Yes   BMI 32.78 kg/m²       Injection Site: Injection site is intact on inspection     Complications from Pain Management or Anesthesia:   None    All patient questions were answered.  Follow up pain management is separate from intraoperative anesthetic needs.  Pain care is transitioned to primary service, with management by oral medications.    Thank you for asking us to participate in the care of your patient.    Eliot Nunez MD, 25, 8:36 AM      Eliot Nunez MD, 25, 8:36 AM

## 2025-01-20 NOTE — PLAN OF CARE

## 2025-01-21 RX ORDER — OXYCODONE HYDROCHLORIDE 5 MG/1
5 TABLET ORAL EVERY 6 HOURS PRN
Qty: 10 TABLET | Refills: 0 | Status: SHIPPED | OUTPATIENT
Start: 2025-01-21

## 2025-01-21 RX ORDER — ACETAMINOPHEN 500 MG
1000 TABLET ORAL EVERY 6 HOURS
Qty: 60 TABLET | Refills: 0 | Status: SHIPPED | OUTPATIENT
Start: 2025-01-21

## 2025-01-21 RX ORDER — IBUPROFEN 600 MG/1
600 TABLET, FILM COATED ORAL EVERY 6 HOURS
Qty: 60 TABLET | Refills: 0 | Status: SHIPPED | OUTPATIENT
Start: 2025-01-21

## 2025-01-21 RX ORDER — PSEUDOEPHEDRINE HCL 30 MG
100 TABLET ORAL 2 TIMES DAILY PRN
Qty: 60 CAPSULE | Refills: 0 | Status: SHIPPED | OUTPATIENT
Start: 2025-01-21

## 2025-01-21 NOTE — PROGRESS NOTES
[facility]  Obstetrics and Gynecology    OB/GYN: Postpartum Progress Note     SUBJECTIVE:  Patient is a 29 year old  female who is s/p PCS 2/2 arrest of dilation. She is POD# 2.   Doing well.  Denies fever, chills, N, V, chest pain and SOB. Bleeding has been stable.  Voiding without difficulty.  Passing flatus.  + Bm. Tolerating general diet. Ambulating without difficulty.     OBJECTIVE:  Vitals:    25 0330 25 0753 25 0750   BP: 93/62 92/60  110/58   Pulse: 61 60  62   Resp: 16 16 16 16   Temp:  97.4 °F (36.3 °C) 98.7 °F (37.1 °C) 97.9 °F (36.6 °C)   TempSrc:  Oral Oral Oral   SpO2:   98%    Weight:       Height:           Physical Exam:  Lungs:   CTAB    Cardiovascular:   RRR      General:    AAA. NAD.    Bowel Sounds:  active   Abdomen Soft, nontender, nondistended, +BS    Lochia:  appropriate   Uterine Fundus:   firm below umbilicus   Incision:  healing well, no significant drainage, no dehiscence, no significant erythema with steristrips in place    DVT Evaluation:  No evidence of DVT seen on physical exam.  Negative Sukh's sign.  No cords or calf tenderness.  No significant calf/ankle edema.     Urinary output is adequate.   I/O last 3 completed shifts:  In: -   Out: 600 [Urine:600]      Labs:  Recent Labs   Lab 25  1528   RBC 4.02   HGB 13.3   HCT 38.6   MCV 96.0   MCH 33.1   MCHC 34.5   RDW 13.2   NEPRELIM 18.36*   WBC 20.8*   .0*       ASSESSMENT/PLAN:  Patient is a 29 year old  female who is s/p PLTCS 2/2 arrest of dilation POD# 2.     Doing well   Continue routine postpartum care  Vitals per routine   Encourage ambulation and IS use   Plan for discharge to home POD#3  Follow up in 2 weeks for incision check      Ynes Ramirez,    [facility] - OBGYN      Note to patient and family   The  Cures Act makes medical notes available to patients in the interest of transparency.  However, please be advised that this is a medical  document.  It is intended as ifzk-mi-dxrc communication.  It is written and medical language may contain abbreviations or verbiage that are technical and unfamiliar.  It may appear blunt or direct.  Medical documents are intended to carry relevant information, facts as evident, and the clinical opinion of the practitioner.

## 2025-01-21 NOTE — DISCHARGE SUMMARY
[facility]  Discharge Summary    Savanah Jansen Patient Status:  inpatient    1995 MRN [id number]   Location 2213/2213-A Attending [facility]   Hosp Day # 3 PCP Carlos Quarles MD     Date of Admission: 2025    Date of Discharge: 25    Admitting Diagnosis:   [place] IUP at 39w1d   Fetal intolerance to labor  Arrest of dilation    Discharge Diagnosis: same s/p PLTCS    Hospital Course: The patient is a 29 year old female now  who was admitted on 2025 for induction of labor. She was initiated with cytotec cervical ripening and then progressed to pitocin, but due to non-reassuring fetal heart tones the pitocin was unable to be increased. A cervical ripening balloon was then used with successful cervical ripening. Attempted to continue induction with Pitocin and AROM, but she still did not progress past 6 cm and then unable to increase pitocin again due to fetal intolerance. Patient's temperature started to increase and non-reassuring FHT noted with fetal tachycardia and recurrent late decelerations, so the decision made to proceed with  delivery. Discussion held with patient about risks, benefits and alternatives to procedure. The patient provided verbal and written consent. S/p PLTCS on 25. Please refer to operative note for further details. POD#1, the patient was doing well. Her pain was well controlled. POD#2, the patient continued to do well. She was ambulating and voiding without difficulty. She was tolerating a general diet. Pain was well controlled. POD#3, the patient continued to do well and was meeting post operative expectations. She was discharged to home and instructed to follow up in 2 weeks.     Consultations: Neonatology     Procedures: PLTCS    Complications:  none    Discharge Condition: Stable    Discharge Medications: Current Discharge Medication List       Medication List        START taking these medications      acetaminophen 500 MG  Tabs  Commonly known as: Tylenol Extra Strength  Take 2 tablets (1,000 mg total) by mouth every 6 (six) hours.     docusate sodium 100 MG Caps  Commonly known as: COLACE  Take 100 mg by mouth 2 (two) times daily as needed for constipation.     ibuprofen 600 MG Tabs  Commonly known as: Motrin  Take 1 tablet (600 mg total) by mouth every 6 (six) hours.     oxyCODONE 5 MG Tabs  Take 1 tablet (5 mg total) by mouth every 6 (six) hours as needed.            CONTINUE taking these medications      Microlet Lancets Misc  Check blood sugar 4 times per day for gestational diabetes. May substitute if not on insurance formulary            STOP taking these medications      FIBER 7 OR     PRENATAL VITAMINS OR               Where to Get Your Medications        These medications were sent to [id number] - [address] AT [facility] [address], [phone], [phone]  [address] [place] [id number]      Phone: [phone]   acetaminophen 500 MG Tabs  docusate sodium 100 MG Caps  ibuprofen 600 MG Tabs  oxyCODONE 5 MG Tabs           Follow up Visits: Follow-up with [facility] in 2 weeks      Ynes Ramirez,    [facility] - [facility]BGYN      Note to patient and family   The 21st Century Cures Act makes medical notes available to patients in the interest of transparency.  However, please be advised that this is a medical document.  It is intended as wzaf-qm-azjk communication.  It is written and medical language may contain abbreviations or verbiage that are technical and unfamiliar.  It may appear blunt or direct.  Medical documents are intended to carry relevant information, facts as evident, and the clinical opinion of the practitioner.

## 2025-01-21 NOTE — DISCHARGE INSTRUCTIONS
[facility] DISCHARGE INSTRUCTIONS     CONGRATULATIONS on the birth of your baby!!  We are happy that we have been able to be of service to you during your pregnancy.  We trust that your experience in the hospital and with the birth of your child has been a pleasant one.  These instructions are for your reference concerning your care at home between now and your follow up visit.  Please call the office within the next few days to schedule your appointment.    REST  Since fatigue will probably be your biggest problem, you should try to rest in the morning and in the afternoon for at least 1½-2 hours if possible.  Getting up in the middle of the night to feed your baby interrupts your sleep cycle; two 4 hour periods of sleep do not equal one 8 hour period.  During the day while your baby is sleeping, do whatever you can to isolate yourself from the rest of the world so you can rest (i.e. turn off the phone ringer, put a sign on the front doorbell).    POSTPARTUM BLUES/DEPRESSION  Mood swings, crying spells, feeling overwhelmed and irritability are very common after childbirth.  Most women (50-80%) will experience some of these symptoms, which can last from a few days to 2 weeks.  If your symptoms last more than 2 weeks and/or include any of the following, you may have postpartum depression:  -feelings of sadness      -inability to care for yourself or your baby  -loss of interest in usual activities     -recurring thoughts of death/suicide  -difficulty sleeping or increased need for sleep   -feelings of worthlessness/hopelessness    Postpartum depression occurs in 8-12% of new mothers and is more common in mothers with a past history of depression.  If you have any of these symptoms and/or they persist for more than 2 weeks, please call our office/answering service or [facility] at [phone] or the Mom's Help Line at [phone].    BREAST FEEDING  It is important to  be in a relaxed atmosphere when you are nursing.  You will find that your breasts will engorge and become tender within the next few days.  Even though the suction your baby creates may not meet your expectations, remember that he/she is just learning.  If your breasts remain hard after nursing, you may use a breast pump to release the remaining milk.  This will stimulate your breasts to produce more milk when the need arises.  If your nipples become sore, you should use Lanisol (lanolin) cream and allow your nipples to air-dry after nursing.  It will take approximately 10-12 days for milk supply and demand to balance, so please be patient.  Breastfeeding requires ample rest, fluids (8-10 glasses of water/day) and adequate calories (approximately 2200cal/day).  Please continue your prenatal vitamins the entire time you are breastfeeding.  The lactation consultants at [facility] are available at [phone] to answer any questions or help with any problems.    BREAST CARE  We advise non-nursing mothers to continuously wear a tight-fitting bra and avoid any nipple/breast stimulation.  You should keep your bra as tight as you can tolerate, as this will help dry up your milk.  If your breasts are painful and feel engorged, you may use ice bags and Motrin for relief.  The engorgement and pain/discomfort will usually resolve within 1 week.  You may have some milky discharge from your breasts for several weeks.           EPISIOTOMY CARE  If you have an episiotomy/tear, the stitches used to close the incision will dissolve by themselves.  This process will take at least six weeks during which you should be careful with the area.  If peripads tend to chafe and irritate your skin, we recommend that you place a Tucks, a gauze filled with soothing medication, between your stitches and the peripad.  Tucks can by purchased at your local pharmacy.  Sitz baths may also aid in pain relief and healing; soak your bottom in a tub filled  with room temperature water for 20 minutes once or twice per day.  We prefer that you take showers rather than baths, and avoid swimming, for 4-6 weeks after delivery.    SEXUAL INTERCOURSE  Please avoid tampons, douching and intercourse for at least 4-6 weeks, or until you have stopped bleeding, whichever is longer.  When you do resume intercourse, realize that you can start ovulating/become pregnant as early as 2-3 weeks after delivery, even if you are breastfeeding.  Please ask one of your doctors or call the office if you have any questions or desire contraception for use before your six-week check-up.  You may notice more vaginal dryness than usual, especially if you are breastfeeding.  An over-the-counter lubricant such as Replens or Astroglide may provide some relief.  Pain/discomfort at the episiotomy site is not unusual and may last anywhere from 2 weeks to several months.  Massaging the area may help to soften the scar tissue and hasten the healing process.    MENSTRUATION  You may have a vaginal discharge/light bleeding for anywhere from 2-6 weeks after delivery.  The flow may taper off and then suddenly become heavier a few weeks later.  Your first real period may come any time from 4-12 weeks after delivery, but may not come at all if you are nursing.    EXERCISE  We recommend that you avoid strenuous exercise or housework for at least 3-6 weeks after delivery. Realize that your exercise tolerance will be reduced, and therefore you should start slowly and increase gradually.  Walking is acceptable, unless it causes too much discomfort or fatigue.  You should climb stairs the least amount possible for the first week or two after delivery.  When you do climb them, take them slowly.  Avoid making numerous trips when you can organize and make just one.  You should also avoid driving a car, if possible, for the first 1-2 weeks.  Kegels exercises are important to maintain good pelvic muscle tone and help  eliminate any urine leakage.  The exercises involve tightening the muscles around the vagina; try stopping urination in midstream or squeezing around your fingers to learn the correct muscle groups.  Do these exercises several times a day in repetitions of 10; try doing them every time you get to a stoplight or a commercial comes on the TV.    VITAMINS  We strongly encourage you to continue taking your prenatal vitamin until your six-week check-up or until you stop nursing, whichever is longer.  The iron in the vitamin will help to resolve any anemia from the blood loss of the delivery.  Adequate calcium intake is important for all women, but especially while nursing.  Your total calcium intake should be 1200mg/day.  Since the average women gets approximately 500mg in her diet, most women will require 500-700mg of supplemental calcium/day.  You can purchase a supplement such as Tums Ex, Citracal, Oscal or Viactin at your local pharmacy.    WE WISH YOU MUCH JAYY WITH YOUR NEW BABY AND MAY YOUR NIGHTS BE RESTFUL!!    SPECIAL INSTRUCTIONS FOLLOWING YOUR   As a result of the abdominal incision, recovery from a  Section is more involved than the recovery from a vaginal delivery.  Activities are more restricted, and you should not lift more than the weight of your baby for about 6 weeks unless absolutely necessary.  The most dangerous place we go is our roads.  If you cannot make a split second movement, then you should not be driving.  For some that might take 10 days, for others 4 or more weeks.  We recommend showers rather than baths for the first 2-3 weeks.  In the beginning, you will tire more easily and require additional rest.  You should continue taking prenatal vitamins until your 6 week checkup, or as long as you are nursing.    The incision requires about 6 weeks to heal completely.  You may notice a small amount of drainage for the first few days after your surgery.  Please call right away if  you notice an increasing amount of discharge, increasing redness/pain, or if there is an odor.    Vaginal bleeding should resemble a normal to light period for anywhere from 2-6 weeks.  You may experience a day or two of heavy bleeding with blood clots during this time.  If there is persistent heavy bleeding, please notify us.  Please refrain from sexual intercourse, douching or tampons for 6 weeks (even in the absence of an episiotomy) because of the risk of pelvic infection.      Please call our office in the next few days to arrange for both your 6 week postpartum visit and a 2 week postoperative visit.

## 2025-01-22 VITALS
SYSTOLIC BLOOD PRESSURE: 114 MMHG | TEMPERATURE: 98 F | RESPIRATION RATE: 16 BRPM | HEIGHT: 65 IN | HEART RATE: 69 BPM | BODY MASS INDEX: 32.82 KG/M2 | OXYGEN SATURATION: 98 % | DIASTOLIC BLOOD PRESSURE: 73 MMHG | WEIGHT: 197 LBS

## 2025-01-22 NOTE — PROGRESS NOTES
Went over discharge paperwork with patient. Patient understands she needs to follow up in 2 weeks for an incision check. Went over signs and symptoms of preeclampsia- pt encouraged to call OB if experiencing any. Bands matched with infant and .

## 2025-01-23 ENCOUNTER — MED REC SCAN ONLY (OUTPATIENT)
Dept: FAMILY MEDICINE CLINIC | Facility: CLINIC | Age: 30
End: 2025-01-23

## 2025-01-24 ENCOUNTER — TELEPHONE (OUTPATIENT)
Dept: OBGYN UNIT | Facility: HOSPITAL | Age: 30
End: 2025-01-24

## 2025-02-03 ENCOUNTER — POSTPARTUM (OUTPATIENT)
Dept: OBGYN CLINIC | Facility: CLINIC | Age: 30
End: 2025-02-03
Payer: COMMERCIAL

## 2025-02-03 VITALS
HEART RATE: 89 BPM | HEIGHT: 65 IN | WEIGHT: 181.38 LBS | BODY MASS INDEX: 30.22 KG/M2 | SYSTOLIC BLOOD PRESSURE: 108 MMHG | DIASTOLIC BLOOD PRESSURE: 62 MMHG

## 2025-02-03 DIAGNOSIS — Z98.891 STATUS POST PRIMARY LOW TRANSVERSE CESAREAN SECTION: ICD-10-CM

## 2025-02-03 DIAGNOSIS — Z09 POSTOPERATIVE EXAMINATION: Primary | ICD-10-CM

## 2025-02-03 PROCEDURE — 99024 POSTOP FOLLOW-UP VISIT: CPT | Performed by: OBSTETRICS & GYNECOLOGY

## 2025-02-03 PROCEDURE — 3074F SYST BP LT 130 MM HG: CPT | Performed by: OBSTETRICS & GYNECOLOGY

## 2025-02-03 PROCEDURE — 3078F DIAST BP <80 MM HG: CPT | Performed by: OBSTETRICS & GYNECOLOGY

## 2025-02-03 PROCEDURE — 3008F BODY MASS INDEX DOCD: CPT | Performed by: OBSTETRICS & GYNECOLOGY

## 2025-03-04 ENCOUNTER — POSTPARTUM (OUTPATIENT)
Dept: OBGYN CLINIC | Facility: CLINIC | Age: 30
End: 2025-03-04
Payer: COMMERCIAL

## 2025-03-04 VITALS
BODY MASS INDEX: 30.07 KG/M2 | HEIGHT: 65 IN | WEIGHT: 180.5 LBS | DIASTOLIC BLOOD PRESSURE: 62 MMHG | SYSTOLIC BLOOD PRESSURE: 96 MMHG | HEART RATE: 71 BPM

## 2025-03-04 DIAGNOSIS — Z98.891 STATUS POST PRIMARY LOW TRANSVERSE CESAREAN SECTION: ICD-10-CM

## 2025-03-04 PROCEDURE — 3078F DIAST BP <80 MM HG: CPT | Performed by: OBSTETRICS & GYNECOLOGY

## 2025-03-04 PROCEDURE — 3074F SYST BP LT 130 MM HG: CPT | Performed by: OBSTETRICS & GYNECOLOGY

## 2025-03-04 PROCEDURE — 3008F BODY MASS INDEX DOCD: CPT | Performed by: OBSTETRICS & GYNECOLOGY

## 2025-03-04 NOTE — PROGRESS NOTES
POSTPARTUM VISIT    S: patient is a 29 year old  who presents today for post partum visit. She underwent LTCS on 25.     Patient Active Problem List   Diagnosis    Overweight (BMI 25.0-29.9)    UTI (urinary tract infection) during pregnancy, unspecified ([facility])    Rh negative state in antepartum period ([facility])    Diet controlled gestational diabetes mellitus (GDM), antepartum ([facility])    Pregnancy ([facility])    Status post primary low transverse  section    Arrest of dilation, delivered, current hospitalization ([facility])    Fetal intolerance to labor, delivered, current hospitalization ([facility])        She is doing well, bonding with baby Harned. Formula feeding. Bleeding stopped.  Mood: good. Denies SI/HI.  Depression Scale Total: 1 (3/4/2025 11:15 AM)      Review of Systems   Constitutional: Negative for activity change, appetite change, chills, fever and unexpected weight change.   HENT: Negative for congestion.    Respiratory: Negative for shortness of breath and wheezing.    Cardiovascular: Negative for chest pain.   Breast: denies pain or skin changes  Gastrointestinal: Negative for vomiting, abdominal pain and abdominal distention.   Genitourinary: Negative for dysuria, frequency, hematuria, vaginal discharge, difficulty urinating, genital sores, vaginal pain, pelvic pain.   Skin: Negative for wound.   Neurological: Negative for dizziness, seizures, numbness and headaches.   Psychiatric/Behavioral: Negative for suicidal ideas.       O:   Vitals:    25 1112   BP: 96/62   Pulse: 71   Weight: 180 lb 8 oz (81.9 kg)   Height: 65\"       Body mass index is 30.04 kg/m².     General:  NAD, AAOx3   Abdomen: soft, nontender, nondistended. Incision well healed without erythema or exudate.  Ext: nontender, no edema  GYNE/: deferred              A/P:  -Postpartum depression screen reassuring  -Contraception: scheduled for Mirena IUD 3/27  -Pap smear: neg       Tonja Salas MD  [facility]  3/4/2025 11:33 AM

## 2025-03-27 ENCOUNTER — OFFICE VISIT (OUTPATIENT)
Dept: OBGYN CLINIC | Facility: CLINIC | Age: 30
End: 2025-03-27
Payer: COMMERCIAL

## 2025-03-27 VITALS
HEIGHT: 65 IN | SYSTOLIC BLOOD PRESSURE: 112 MMHG | DIASTOLIC BLOOD PRESSURE: 82 MMHG | HEART RATE: 70 BPM | BODY MASS INDEX: 29.66 KG/M2 | WEIGHT: 178 LBS

## 2025-03-27 DIAGNOSIS — Z30.430 ENCOUNTER FOR INSERTION OF INTRAUTERINE CONTRACEPTIVE DEVICE (IUD): Primary | ICD-10-CM

## 2025-03-27 PROBLEM — Z97.5 IUD (INTRAUTERINE DEVICE) IN PLACE: Status: ACTIVE | Noted: 2025-03-27

## 2025-03-27 PROBLEM — Z34.90 PREGNANCY (HCC): Status: RESOLVED | Noted: 2025-01-18 | Resolved: 2025-03-27

## 2025-03-27 PROBLEM — Z86.32 HISTORY OF DIET CONTROLLED GESTATIONAL DIABETES MELLITUS (GDM): Status: ACTIVE | Noted: 2024-10-06

## 2025-03-27 PROCEDURE — 58300 INSERT INTRAUTERINE DEVICE: CPT | Performed by: OBSTETRICS & GYNECOLOGY

## 2025-03-27 PROCEDURE — 3074F SYST BP LT 130 MM HG: CPT | Performed by: OBSTETRICS & GYNECOLOGY

## 2025-03-27 PROCEDURE — 3079F DIAST BP 80-89 MM HG: CPT | Performed by: OBSTETRICS & GYNECOLOGY

## 2025-03-27 PROCEDURE — 3008F BODY MASS INDEX DOCD: CPT | Performed by: OBSTETRICS & GYNECOLOGY

## 2025-03-27 NOTE — PATIENT INSTRUCTIONS
[facility] [place] Department of OB/GYN  After Care Instructions for IUD      Bleeding   You may experience irregular bleeding for the fist 3-6 months.  If your bleeding becomes heavier than a normal menstrual cycle, please contact our office.    Pain  You may experience mild menstrual cramping after the IUD insertion that will typically last 24-48hrs.  You may take Ibuprofen, Tylenol or Aleve to relieve the discomfort.  If you experience severe or persistent pain, please contact our office.       Restrictions    You should avoid sexual intercourse or tampon use for 1 day after insertion.  Please use a backup method of contraception for 4-7 days with the Mirena and Ivana.    When to contact our office  If you are experiencing discomfort described as worse than menstrual cramps that is not relieved by ibuprofen   Fever of 100.4 or greater  Vaginal bleeding that is saturating 1 pad per hour    Any discomfort or poking sensation during intercourse or other sexual activity      Follow up in 4-6 weeks for IUD check.   If you have additional questions or concerns, please call us at [phone].

## 2025-03-27 NOTE — PROGRESS NOTES
[facility]  Obstetrics and Gynecology  Procedure Note      Savanah Jansen is a 29 year old  who presents today for Mirena IUD insertion. Patient's last menstrual period was 2025 (exact date).  The procedure was reviewed in detail, consent form signed and time out performed.    Vitals:    25 1435   BP: 112/82   Pulse: 70        Procedure details:  Patient was taken to the procedure room. A sterile speculum was placed and the cervix was visualized and cleansed with betadine. An Allis clamp was placed on the anterior cervix. The uterus was sounded to 9 cm. The Mirena IUD was then placed without difficulty per 's instructions. The strings were trimmed to 2-3 cm. The clamp and speculum were removed.    Post-procedure instructions were reviewed, including pelvic rest. She was advised to return in 6 weeks for an IUD string check.    Tonja Salas MD  [facility]  3/27/2025 2:57 PM

## 2025-07-20 ENCOUNTER — PATIENT MESSAGE (OUTPATIENT)
Dept: OBGYN CLINIC | Facility: CLINIC | Age: 30
End: 2025-07-20

## (undated) NOTE — LETTER
Date: 12/9/2023    Patient Name: Jad Ruiz          To Whom it may concern: This letter has been written at the patient's request. The above patient was seen at the [facility] for treatment of a medical condition. This patient should be excused from attending work from 12/09/2023 through 12/11/2023. The patient may return to work on 12/12/2023 as long has been afebrile 24hours prior to the start of her next scheduled shift.         Sincerely,        GRETCHEN Wilcox

## (undated) NOTE — LETTER
[address], :1995    CONSENT FOR PROCEDURE/SEDATION    1. I authorize the performance upon [address]  the following: Nexplanon removal and re insertion    2.  I authorize Dr. Pilo Bucio MD (and whomever is designated as the doctor’s Relationship to patient: ____________________________________________    Witness: _________________________________________ Date:___________     Physician Signature: _______________________________ Date:___________

## (undated) NOTE — Clinical Note
Patient seen today for GDM education follow up. Please refer to note. No further follow up indicated at this unless change in treatment plan. Thank you for the referral and your time. She has been wearing Stelo CGM by [employer], I have detailed reports that I sent to scan. I can also send to you directly if you would like to look, just let me know!

## (undated) NOTE — LETTER
Savanah Jansen, :1995    CONSENT FOR PROCEDURE/SEDATION    1. I authorize the performance upon Savanah Jansen  the following: Intrauterine Device Mirena insertion    2. I authorize Dr. Tonja Muhammad MD (and whomever is designated as the doctor’s assistant), to perform the above-mentioned procedures.    3. If any unforeseen conditions arise during this procedure calling for additional  procedures, operations, or medications (including anesthesia and blood transfusion), I further request and authorize the doctor to do whatever he/she deems advisable in my interest.    4. I consent to the taking and reproduction of any photographs in the course of this procedure for professional purposes.    5. I consent to the administration of such sedation as may be considered necessary or advisable by the physician responsible for this service, with the exception of ______________________________________________________    6. I have been informed by my doctor of the nature and purpose of this procedure sedation, possible alternative methods of treatment, risk involved and possible complications.    7. If I have a Do Not Resuscitate (DNR) order in place, the physician and I (or the individual authorized to consent on my behalf) will discuss and agree as to whether the Do Not Resuscitate (DNR) order will remain in effect or will be discontinued during the performance of the procedure and the applicable recovery period. If the Do Not Resuscitate (DNR) order is discontinued and is to be reinstated following the procedure/recovery period, the physician will determine when the applicable recovery period ends for purposes of reinstating the Do Not Resuscitate (DNR) order.    Signature of Patient:_______________________________________________    Signature of person authorized to consent for patient:  _______________________________________________________________    Relationship to patient:  ____________________________________________    Witness: _________________________________________ Date:___________     Physician Signature: _______________________________ Date:___________

## (undated) NOTE — LETTER
Savanah Jansen, :1995    CONSENT FOR PROCEDURE/SEDATION    1. I authorize the performance upon Savanah Jansen  the following: Intrauterine Device Removal-Kyleena    2. I authorize Dr. Alyssia Decker MD (and whomever is designated as the doctor’s assistant), to perform the above-mentioned procedures.    3. If any unforeseen conditions arise during this procedure calling for additional  procedures, operations, or medications (including anesthesia and blood transfusion), I further request and authorize the doctor to do whatever he/she deems advisable in my interest.    4. I consent to the taking and reproduction of any photographs in the course of this procedure for professional purposes.    5. I consent to the administration of such sedation as may be considered necessary or advisable by the physician responsible for this service, with the exception of ______________________________________________________    6. I have been informed by my doctor of the nature and purpose of this procedure sedation, possible alternative methods of treatment, risk involved and possible complications.    7. If I have a Do Not Resuscitate (DNR) order in place, the physician and I (or the individual authorized to consent on my behalf) will discuss and agree as to whether the Do Not Resuscitate (DNR) order will remain in effect or will be discontinued during the performance of the procedure and the applicable recovery period. If the Do Not Resuscitate (DNR) order is discontinued and is to be reinstated following the procedure/recovery period, the physician will determine when the applicable recovery period ends for purposes of reinstating the Do Not Resuscitate (DNR) order.    Signature of Patient:_______________________________________________    Signature of person authorized to consent for patient:  _______________________________________________________________    Relationship to patient:  ____________________________________________    Witness: _________________________________________ Date:___________     Physician Signature: _______________________________ Date:___________

## (undated) NOTE — MR AVS SNAPSHOT
[facility] [place]  [address] [id number] [phone]               Thank you for choosing us for your health care visit with Sharifa Escalante MD.  We are glad to serve you and happy to provide you with this summary o